# Patient Record
Sex: MALE | Race: OTHER | HISPANIC OR LATINO
[De-identification: names, ages, dates, MRNs, and addresses within clinical notes are randomized per-mention and may not be internally consistent; named-entity substitution may affect disease eponyms.]

---

## 2018-03-22 ENCOUNTER — APPOINTMENT (OUTPATIENT)
Dept: NEPHROLOGY | Facility: CLINIC | Age: 48
End: 2018-03-22

## 2020-12-02 ENCOUNTER — INPATIENT (INPATIENT)
Facility: HOSPITAL | Age: 50
LOS: 8 days | Discharge: HOME | End: 2020-12-11
Attending: INTERNAL MEDICINE | Admitting: INTERNAL MEDICINE
Payer: COMMERCIAL

## 2020-12-02 VITALS
TEMPERATURE: 98 F | OXYGEN SATURATION: 100 % | DIASTOLIC BLOOD PRESSURE: 90 MMHG | HEIGHT: 68 IN | HEART RATE: 80 BPM | SYSTOLIC BLOOD PRESSURE: 163 MMHG | RESPIRATION RATE: 18 BRPM

## 2020-12-02 DIAGNOSIS — N19 UNSPECIFIED KIDNEY FAILURE: ICD-10-CM

## 2020-12-02 DIAGNOSIS — E03.9 HYPOTHYROIDISM, UNSPECIFIED: ICD-10-CM

## 2020-12-02 DIAGNOSIS — N18.6 END STAGE RENAL DISEASE: ICD-10-CM

## 2020-12-02 DIAGNOSIS — I10 ESSENTIAL (PRIMARY) HYPERTENSION: ICD-10-CM

## 2020-12-02 LAB
ALBUMIN SERPL ELPH-MCNC: 4.3 G/DL — SIGNIFICANT CHANGE UP (ref 3.3–5)
ALP SERPL-CCNC: 62 U/L — SIGNIFICANT CHANGE UP (ref 40–120)
ALT FLD-CCNC: 15 U/L — SIGNIFICANT CHANGE UP (ref 4–41)
ANION GAP SERPL CALC-SCNC: 21 MMO/L — HIGH (ref 7–14)
APTT BLD: 30.2 SEC — SIGNIFICANT CHANGE UP (ref 27–36.3)
AST SERPL-CCNC: 12 U/L — SIGNIFICANT CHANGE UP (ref 4–40)
BASE EXCESS BLDV CALC-SCNC: -7 MMOL/L — SIGNIFICANT CHANGE UP
BASOPHILS # BLD AUTO: 0.05 K/UL — SIGNIFICANT CHANGE UP (ref 0–0.2)
BASOPHILS NFR BLD AUTO: 0.7 % — SIGNIFICANT CHANGE UP (ref 0–2)
BILIRUB SERPL-MCNC: 0.3 MG/DL — SIGNIFICANT CHANGE UP (ref 0.2–1.2)
BLOOD GAS VENOUS - CREATININE: SIGNIFICANT CHANGE UP MG/DL (ref 0.5–1.3)
BLOOD GAS VENOUS - FIO2: 21 — SIGNIFICANT CHANGE UP
BUN SERPL-MCNC: 102 MG/DL — HIGH (ref 7–23)
CALCIUM SERPL-MCNC: 6.5 MG/DL — CRITICAL LOW (ref 8.4–10.5)
CHLORIDE BLDV-SCNC: 106 MMOL/L — SIGNIFICANT CHANGE UP (ref 96–108)
CHLORIDE SERPL-SCNC: 101 MMOL/L — SIGNIFICANT CHANGE UP (ref 98–107)
CO2 SERPL-SCNC: 17 MMOL/L — LOW (ref 22–31)
CREAT SERPL-MCNC: 14.25 MG/DL — HIGH (ref 0.5–1.3)
EOSINOPHIL # BLD AUTO: 0.11 K/UL — SIGNIFICANT CHANGE UP (ref 0–0.5)
EOSINOPHIL NFR BLD AUTO: 1.6 % — SIGNIFICANT CHANGE UP (ref 0–6)
GAS PNL BLDV: 140 MMOL/L — SIGNIFICANT CHANGE UP (ref 136–146)
GLUCOSE BLDV-MCNC: 111 MG/DL — HIGH (ref 70–99)
GLUCOSE SERPL-MCNC: 111 MG/DL — HIGH (ref 70–99)
HCO3 BLDV-SCNC: 18 MMOL/L — LOW (ref 20–27)
HCT VFR BLD CALC: 26.5 % — LOW (ref 39–50)
HCT VFR BLDV CALC: 29.1 % — LOW (ref 39–51)
HGB BLD-MCNC: 8.8 G/DL — LOW (ref 13–17)
HGB BLDV-MCNC: 9.4 G/DL — LOW (ref 13–17)
IMM GRANULOCYTES NFR BLD AUTO: 0.3 % — SIGNIFICANT CHANGE UP (ref 0–1.5)
INR BLD: 1.07 — SIGNIFICANT CHANGE UP (ref 0.88–1.16)
LACTATE BLDV-MCNC: 0.9 MMOL/L — SIGNIFICANT CHANGE UP (ref 0.5–2)
LYMPHOCYTES # BLD AUTO: 1.1 K/UL — SIGNIFICANT CHANGE UP (ref 1–3.3)
LYMPHOCYTES # BLD AUTO: 16.1 % — SIGNIFICANT CHANGE UP (ref 13–44)
MCHC RBC-ENTMCNC: 28.9 PG — SIGNIFICANT CHANGE UP (ref 27–34)
MCHC RBC-ENTMCNC: 33.2 % — SIGNIFICANT CHANGE UP (ref 32–36)
MCV RBC AUTO: 86.9 FL — SIGNIFICANT CHANGE UP (ref 80–100)
MONOCYTES # BLD AUTO: 0.74 K/UL — SIGNIFICANT CHANGE UP (ref 0–0.9)
MONOCYTES NFR BLD AUTO: 10.8 % — SIGNIFICANT CHANGE UP (ref 2–14)
NEUTROPHILS # BLD AUTO: 4.81 K/UL — SIGNIFICANT CHANGE UP (ref 1.8–7.4)
NEUTROPHILS NFR BLD AUTO: 70.5 % — SIGNIFICANT CHANGE UP (ref 43–77)
NRBC # FLD: 0 K/UL — SIGNIFICANT CHANGE UP (ref 0–0)
PCO2 BLDV: 42 MMHG — SIGNIFICANT CHANGE UP (ref 41–51)
PH BLDV: 7.27 PH — LOW (ref 7.32–7.43)
PLATELET # BLD AUTO: 193 K/UL — SIGNIFICANT CHANGE UP (ref 150–400)
PMV BLD: 9 FL — SIGNIFICANT CHANGE UP (ref 7–13)
PO2 BLDV: 42 MMHG — HIGH (ref 35–40)
POTASSIUM BLDV-SCNC: 4 MMOL/L — SIGNIFICANT CHANGE UP (ref 3.4–4.5)
POTASSIUM SERPL-MCNC: 4.1 MMOL/L — SIGNIFICANT CHANGE UP (ref 3.5–5.3)
POTASSIUM SERPL-SCNC: 4.1 MMOL/L — SIGNIFICANT CHANGE UP (ref 3.5–5.3)
PROT SERPL-MCNC: 8.2 G/DL — SIGNIFICANT CHANGE UP (ref 6–8.3)
PROTHROM AB SERPL-ACNC: 12.3 SEC — SIGNIFICANT CHANGE UP (ref 10.6–13.6)
RBC # BLD: 3.05 M/UL — LOW (ref 4.2–5.8)
RBC # FLD: 13.2 % — SIGNIFICANT CHANGE UP (ref 10.3–14.5)
SAO2 % BLDV: 74.7 % — SIGNIFICANT CHANGE UP (ref 60–85)
SARS-COV-2 RNA SPEC QL NAA+PROBE: SIGNIFICANT CHANGE UP
SODIUM SERPL-SCNC: 139 MMOL/L — SIGNIFICANT CHANGE UP (ref 135–145)
WBC # BLD: 6.83 K/UL — SIGNIFICANT CHANGE UP (ref 3.8–10.5)
WBC # FLD AUTO: 6.83 K/UL — SIGNIFICANT CHANGE UP (ref 3.8–10.5)

## 2020-12-02 PROCEDURE — 99284 EMERGENCY DEPT VISIT MOD MDM: CPT

## 2020-12-02 PROCEDURE — 99223 1ST HOSP IP/OBS HIGH 75: CPT

## 2020-12-02 RX ORDER — CALCIUM ACETATE 667 MG
2001 TABLET ORAL
Refills: 0 | Status: DISCONTINUED | OUTPATIENT
Start: 2020-12-02 | End: 2020-12-11

## 2020-12-02 RX ORDER — CALCIUM GLUCONATE 100 MG/ML
2 VIAL (ML) INTRAVENOUS ONCE
Refills: 0 | Status: DISCONTINUED | OUTPATIENT
Start: 2020-12-02 | End: 2020-12-02

## 2020-12-02 RX ORDER — LEVOTHYROXINE SODIUM 125 MCG
112 TABLET ORAL DAILY
Refills: 0 | Status: DISCONTINUED | OUTPATIENT
Start: 2020-12-02 | End: 2020-12-11

## 2020-12-02 RX ORDER — METOPROLOL TARTRATE 50 MG
50 TABLET ORAL DAILY
Refills: 0 | Status: DISCONTINUED | OUTPATIENT
Start: 2020-12-02 | End: 2020-12-11

## 2020-12-02 RX ORDER — ERYTHROPOIETIN 10000 [IU]/ML
10000 INJECTION, SOLUTION INTRAVENOUS; SUBCUTANEOUS
Refills: 0 | Status: DISCONTINUED | OUTPATIENT
Start: 2020-12-02 | End: 2020-12-09

## 2020-12-02 RX ORDER — LEVOTHYROXINE SODIUM 125 MCG
112 TABLET ORAL DAILY
Refills: 0 | Status: DISCONTINUED | OUTPATIENT
Start: 2020-12-02 | End: 2020-12-02

## 2020-12-02 NOTE — ED ADULT TRIAGE NOTE - CHIEF COMPLAINT QUOTE
Pt sent to ED by nephrologist Dr. Jonh Mccarthy to start dialysis. Pt reports he is in kidney failure. Denies chest pain, shortness of breath, headaches, palpitations.

## 2020-12-02 NOTE — H&P ADULT - NSHPPHYSICALEXAM_GEN_ALL_CORE
T(C): 36.6 (12-02-20 @ 15:27), Max: 36.7 (12-02-20 @ 14:48)  HR: 72 (12-02-20 @ 15:27) (72 - 80)  BP: 129/97 (12-02-20 @ 15:27) (129/97 - 187/100)  RR: 14 (12-02-20 @ 15:27) (14 - 18)  SpO2: 100% (12-02-20 @ 15:27) (100% - 100%)  Wt(kg): --  GENERAL: NAD, well-developed  HEAD:  Atraumatic, Normocephalic  EYES: EOMI, PERRLA, conjunctiva and sclera clear  NECK: Supple, No JVD  CHEST/LUNG: Clear to auscultation bilaterally; No wheeze  HEART: Regular rate and rhythm; No murmurs, rubs, or gallops  ABDOMEN: Soft, Nontender, Nondistended; Bowel sounds present  EXTREMITIES:  2+ Peripheral Pulses, No clubbing, cyanosis, or edema  PSYCH: AAOx3  NEUROLOGY: non-focal  SKIN: No rashes or lesions

## 2020-12-02 NOTE — ED ADULT TRIAGE NOTE - HEART RATE (BEATS/MIN)
Discharge Planning Assessment  TITUS Welch     Patient Name: Charlie Solano  MRN: [id number]  Today's Date: 4/9/2020    Admit Date: 4/8/2020    Discharge Needs Assessment       Row Name 04/09/20 1459       Living Environment    Lives With  child(mirna), adult;spouse    Name(s) of Who Lives With Patient  Savanah Solano, wife and 22 year old son    Current Living Arrangements  home/apartment/condo single story house with three streets to gain entry    Duration at Residence  8 1/2 years    Potentially Unsafe Housing Conditions  -- none    Primary Care Provided by  self    Provides Primary Care For  no one    Caregiving Concerns  none voiced    Family Caregiver if Needed  spouse;child(mirna), adult    Family Caregiver Names  Savanah and 22 year old son    Quality of Family Relationships  unable to assess    Able to Return to Prior Arrangements  yes    Living Arrangement Comments  pt states he lives with his wife and 22 year old son in a single story home with three steps to gain entry       Resource/Environmental Concerns    Resource/Environmental Concerns  none    Transportation Concerns  -- none       Transition Planning    Patient/Family Anticipates Transition to  home with family    Patient/Family Anticipated Services at Transition  ;home health care    Transportation Anticipated  car, drives self;family or friend will provide pt states he still drives but his wife will be able to provide ride at discharge       Discharge Needs Assessment    Readmission Within the Last 30 Days  no previous admission in last 30 days    Concerns to be Addressed  discharge planning    Concerns Comments  none voiced    Equipment Currently Used at Home  bath bench;crutches;prosthesis;grab bar;bipap/cpap CPAP through [employer]    Anticipated Changes Related to Illness  none    Equipment Needed After Discharge  -- undecided at this time    Outpatient/Agency/Support Group Needs  home care agency    Discharge Facility/Level of Care Needs   home with home health    Provided Post Acute Provider List?  Yes    Post Acute Provider List  Home Health    Delivered To  Patient    Method of Delivery  In person    Patient's Choice of Community Agency(s)  None    Current Discharge Risk  -- none    Discharge Coordination/Progress  pt states he plans on returning home at discharge with his wife and son to help as needed and followed by home health, pt is undecided if he will need any other equipment            Discharge Plan       Row Name 04/09/20 1507       Plan    Plan  Home with home health    Patient/Family in Agreement with Plan  yes    Plan Comments  Into room and introduced self and role of CM. Discussed discharge disposition with patient at bedside. Patient confirms that the info on her face sheet and see's Kvng Hernandez as PCP. He states that he uses [facility] in [place] and has no problem picking up his medications or paying for them. He states that he has a health care POA but it is not on file here, CM encouraged him to bring it in so it can be scanned into his records and he verbalized understanding. Patient states he lives with his wife Savanah and 22 year old son in a single story house with three steps to gain entry and has no problem maneuvering the steps or within his home. He states he is independent with his ADL's still works and drives but his wife will be able to provide ride home at discharge. He states he currently has a prosthetic leg that he uses, grab bars, crutches, bath bench and a CPAP through [facility] and is uncertain if he will need anything else at discharge. Patient states his wife has used home health before but he is unsure of who it was. CM provided patient with a list of home health agencies for his preference. Offered community resources but patient declines the need for them at this time. Patient states he plans on returning home with his wife and son to help and home health for PT/OT. Patient had no other questions  or concerns regarding discharge plans at this time. Name and number placed on white board in room. CM will continue to follow for needs.            Destination        Coordination has not been started for this encounter.          Durable Medical Equipment        Coordination has not been started for this encounter.          Dialysis/Infusion        Coordination has not been started for this encounter.          Home Medical Care        Coordination has not been started for this encounter.          Therapy        Coordination has not been started for this encounter.          Community Resources        Coordination has not been started for this encounter.              Demographic Summary       Row Name 04/09/20 7813       General Information    Admission Type  inpatient    Arrived From  home    Referral Source  admission list    Reason for Consult  discharge planning    Preferred Language  English     Used During This Interaction  no       Contact Information    Permission Granted to Share Info With              Functional Status    No documentation.         Psychosocial    No documentation.         Abuse/Neglect    No documentation.         Legal    No documentation.         Substance Abuse    No documentation.         Patient Forms    No documentation.             Elaina Busby RN     80

## 2020-12-02 NOTE — H&P ADULT - NSHPLABSRESULTS_GEN_ALL_CORE
(12-02 @ 14:45)                      8.8  6.83 )-----------( 193                 26.5    Neutrophils = 4.81 (70.5%)  Lymphocytes = 1.10 (16.1%)  Eosinophils = 0.11 (1.6%)  Basophils = 0.05 (0.7%)  Monocytes = 0.74 (10.8%)  Bands = --%    12-02    139  |  101  |  102<H>  ----------------------------<  111<H>  4.1   |  17<L>  |  14.25<H>    Ca    6.5<LL>      02 Dec 2020 14:45    TPro  8.2  /  Alb  4.3  /  TBili  0.3  /  DBili  x   /  AST  12  /  ALT  15  /  AlkPhos  62  12-02    ( 02 Dec 2020 14:45 )   PT: 12.3 SEC;   INR: 1.07 ;     PTT:30.2 SEC    Venous Blood Gas:  12-02 @ 14:45  7.27/42/42/18/74.7  VBG Lactate: 0.9

## 2020-12-02 NOTE — ED PROVIDER NOTE - CLINICAL SUMMARY MEDICAL DECISION MAKING FREE TEXT BOX
Liliana: ESRD (not yet on dialysis), p/w 1 month nausea, sent by Renal for dialysis (likely 2/2 uremia). Appears euvolemic. Check labs, EKG. Admit to start dialysis.

## 2020-12-02 NOTE — ED PROVIDER NOTE - ATTENDING CONTRIBUTION TO CARE
I performed a face-to-face evaluation of the patient and performed a history and physical examination. I agree with the history and physical examination.    ESRD (not yet on dialysis), p/w 1 month nausea, sent by Renal for dialysis (likely 2/2 uremia). Appears euvolemic. Check labs, EKG. Admit to start dialysis.

## 2020-12-02 NOTE — CONSULT NOTE ADULT - SUBJECTIVE AND OBJECTIVE BOX
Vascular & Interventional Radiology Brief Consult Note    Evaluate for Procedure: Nontunneled catheter placement    HPI: 49y Male with history of hypertension, hypothyroidism, and chronic glomerulonephritis. Over the past few weeks, he has developed worsening nausea and vomiting. His appetite is substantially down from baseline, and he has lost a fair amount of weight. He has also been more fatigued than usual of late, and has had to nap during the day as a result. On bloodwork from last week, his creatinine was over 13mg/dL. He does not presently have any form of access for HD or PD. For the past several months, he has been in the process of being evaluated for renal transplant through [facility]; he has not yet finished the workup, and as a result he is not yet on the transplant list    Allergies:   Medications (Abx/Cardiac/Anticoagulation/Blood Products)      Data:  172.7  T(C): 36.6  HR: 72  BP: 129/97  RR: 14  SpO2: 100%    -WBC 6.83 / HgB 8.8 / Hct 26.5 / Plt 193  -Na 139 / Cl 101 /  / Glucose 111  -K 4.1 / CO2 17 / Cr 14.25  -ALT 15 / Alk Phos 62 / T.Bili 0.3  -INR 1.07 / PTT 30.2      Assessment/Plan:   -49y Male with CKD5 currently being evaluated for renal transplant. Needs HD and does not currently have access. IR consulted for nontunneled catheter placement.    Plan  -Place IR procedure order with Dr. Marlon Serrano for 12/3  -NPO at midnight  -Hold DVT PPx in AM  -BMP and CBC in AM  -Page [phone] for any additional questions.

## 2020-12-02 NOTE — ED ADULT NURSE NOTE - OBJECTIVE STATEMENT
49 year old male A&Ox4, ambulatory with PHX: HTN, hypothyroid, AFIB not on AC sent in by PCP for declining renal function and eval for dialysis. PT states last week was experiencing weakness fatigue, diarrhea, nausea saw PCP yesterday had labs showing worsening renal function and hyperkalemia. PT currently denies any pain or medical complaints. MD evaluated, VS as noted. 20 G IV placed to L arm, labs sent. Comfort measures provided, call bell within reach.

## 2020-12-02 NOTE — ED ADULT NURSE REASSESSMENT NOTE - NS ED NURSE REASSESS COMMENT FT1
Pt awake, alert and oriented x 4 denies chest pain or SOB, denies headache, dizziness or blurry vision.  VSS.   IV site unremarkable.   Ambulating to bathroom to void without difficulty.

## 2020-12-02 NOTE — CONSULT NOTE ADULT - SUBJECTIVE AND OBJECTIVE BOX
HPI: Mr. Chan is a 49 year-old man with history of multiple medical issues including hypertension, hypothyroidism, and chronic glomerulonephritis; he is well-known to me. Over the past few weeks, he has developed worsening nausea and vomiting. His appetite is substantially down from baseline, and he has lost a fair amount of weight. He has also been more fatigued than usual of late, and has had to nap during the day as a result. On bloodwork from last week, his creatinine was over 13mg/dL. In light of his severely elevated creatinine and his associated symptoms, I advised him to present to the ER for dialysis initiation.  Mr. Chan is opting for eventual conversion to peritoneal dialysis. He does not presently have any form of access for HD or PD. For the past several months, he has been in the process of being evaluated for renal transplant through [facility]; he has not yet finished the workup, and as a result he is not yet on the transplant list.  PAST MEDICAL & SURGICAL HISTORY: Hypothyroid HTN (hypertension) CKD5 - glomerulonephritis NOS   Allergies No Known Allergies  SOCIAL HISTORY: Denies ETOh,Smoking,   FAMILY HISTORY: No pertinent family history in first degree relatives  REVIEW OF SYSTEMS: CONSTITUTIONAL: (+)fatigue/(+)hypersomnia; (+)weakness, (+)loss of appetite, (+)weight loss; no fever/chills EYES/ENT: No visual changes;  No vertigo or throat pain  NECK: No pain or stiffness RESPIRATORY: No cough, wheezing, hemoptysis; No shortness of breath CARDIOVASCULAR: No chest pain or palpitations GASTROINTESTINAL: (+)nausea, (+)vomiting GENITOURINARY: No dysuria, frequency or hematuria NEUROLOGICAL: No numbness or weakness SKIN: No itching, burning, rashes, or lesions  All other review of systems is negative unless indicated above.  VITAL: T(C): , Max: 36.7 (12-02-20 @ 14:48) T(F): , Max: 98.1 (12-02-20 @ 14:48) HR: 72 (12-02-20 @ 15:27) BP: 129/97 (12-02-20 @ 15:27) RR: 14 (12-02-20 @ 15:27) SpO2: 100% (12-02-20 @ 15:27)  PHYSICAL EXAM: Constitutional: NAD, Alert HEENT: NCAT, MMM Neck: Supple, No JVD Respiratory: CTA-b/l Cardiovascular: RRR s1s2, no m/r/g Gastrointestinal: BS+, soft, NT/ND Extremities: No peripheral edema b/l Neurological: no focal deficits; strength grossly intact Back: no CVAT b/l Skin: No rashes, no nevi  LABS:                      8.8   6.83  )-----------( 193      ( 02 Dec 2020 14:45 )            26.5   Na(139)/K(4.1)/Cl(101)/HCO3(17)/BUN(102)/Cr(14.25)Glu(111)/Ca(6.5)/Mg(--)/PO4(--)    12-02 @ 14:45   IMAGING: pending from ER  ASSESSMENT: (1)Renal - CKD5; uremic. 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 This is a chronic phenomenon for him. Whereas we can place him on Phoslo with meals, I would not give IV calcium unless (a)total Ca <6, (b)symptoms associated with hypocalcemia, or (c)PO4 is <6 Administration of IV calcium in the setting of hyperphosphatemia could induce metastatic calcification  (3)Metabolic acidosis - renally mediated - will improve with HD. Would not give IV NaHCO3 here, as it would place him at increased risk of complications (seizures, arrhythmias, etc) from hypocalcemia  (4)Anemia - renally mediated   RECOMMEND: (1)Admit to [facility] hospitalist (patient of Dr. Katherin Triplett)  (2)HBSAg/HBSAb/HBCAb/HCVAb STAT  (3)Shiley placement by [facility] scruggs for tomorrow a.m.  (4)HD x 3 consecutive days, once shiley in place (eventual conversion to tunneled catheter during admission) Will plan for high-calcium bath  (5)Phoslo 2001mg po tid with meals; no IV calcium unless meets criteria above for it  (6)Retacrit 10K SQ TIW with HD  (7)Surgery evaluation Dr. Lam Kelly for PD catheter placement  (8)Will look to set up for outside HD/PD   Thank you for involving [facility] in this patient's care.  With warm regards,  Kong Mccarthy MD  [facility] Office: [phone] Cell: [phone]

## 2020-12-02 NOTE — H&P ADULT - HISTORY OF PRESENT ILLNESS
50 yo M hx hypertension, hypothyroidism, and chronic glomerulonephritis and CKD/ESRD sent in from nephrology office for HD. Over the past few weeks, patient has developed worsening nausea and vomiting. His appetite is substantially down from baseline, and he has lost a fair amount of weight. He has also been more fatigued than usual of late, and has had to nap during the day as a result. Per patient's nephrologist' s documentation, patient's creatinine was over 13mg/dL. In light of his severely elevated creatinine and his associated symptoms, patient was advised to present to the ER for dialysis initiation.

## 2020-12-02 NOTE — H&P ADULT - PROBLEM SELECTOR PLAN 1
- Monitor electrolytes and renal functions.  - IR consulted for chavo placement. HD plan per nephrology recs.  - Phos binder TID.  - Renal diet.  - Hold chemo dvt ppx until patient receives chavo placement.  - Consult surgery Dr. Lam Kelly for PD catheter placement in the am.

## 2020-12-02 NOTE — H&P ADULT - NSHPREVIEWOFSYSTEMS_GEN_ALL_CORE
CONSTITUTIONAL: fatigue  EYES/ENT: No visual changes;  No vertigo or throat pain   NECK: No pain or stiffness  RESPIRATORY: No cough, wheezing, hemoptysis; No shortness of breath  CARDIOVASCULAR: No chest pain or palpitations  GASTROINTESTINAL: n/v, no abd pain  GENITOURINARY: No dysuria, frequency or hematuria  NEUROLOGICAL: No numbness or weakness  SKIN: No itching, burning, rashes, or lesions   PSYCH: No Depression, no anxiety  All other review of systems is negative unless indicated above.

## 2020-12-02 NOTE — ED PROVIDER NOTE - OBJECTIVE STATEMENT
Pt is a 50 y/o M PMHx HTN, hypothyroidism, CKD p/w nausea x 3 weeks.  Pt states he developed gradual onset intermittent nausea with associated intermittent nonbloody nonbilious vomiting.  Pt also notes associated fatigue.  He states he had blood work performed with nephrologist last week.  He states he was then told to go to [facility] ED to possibly start dialysis.  Pt otherwise denies any fevers, chills, chest pain, SOB, palpitations, decreased urination, abdominal pain, lightheadedness.

## 2020-12-02 NOTE — H&P ADULT - ASSESSMENT
50 yo M hx hypertension, hypothyroidism, and chronic glomerulonephritis and CKD/ESRD sent in from nephrology office for HD.

## 2020-12-02 NOTE — CONSULT NOTE ADULT - SUBJECTIVE AND OBJECTIVE BOX
CC: 49y old Male admitted with a chief complaint of Need for HD,    HPI:  48 yo M hx hypertension, hypothyroidism, and chronic glomerulonephritis and CKD/ESRD sent in from nephrology office for HD. Over the past few weeks, patient has developed worsening nausea and vomiting. His appetite is substantially down from baseline, and he has lost a fair amount of weight. He has also been more fatigued than usual of late, and has had to nap during the day as a result. Per patient's nephrologist' s documentation, patient's creatinine was over 13mg/dL. In light of his severely elevated creatinine and his associated symptoms, patient was advised to present to the ER for dialysis initiation.    Surgery consulted for placement of peritoneal dialysis catheter for transition eventually from HD to PD. Patient denies hx of previous abdominal procedures.     PMHx: Hypothyroid    HTN (hypertension)      PSHx: No significant past surgical history      Medications (inpatient): calcium acetate 2001 milliGRAM(s) Oral three times a day with meals  epoetin demetrio-epbx (RETACRIT) Injectable 64847 Unit(s) IV Push <User Schedule>  levothyroxine 112 MICROGram(s) Oral daily  metoprolol succinate ER 50 milliGRAM(s) Oral daily    Medications (PRN):  Allergies: No Known Allergies  (Intolerances: )  Social Hx:   Family Hx: No pertinent family history in first degree relatives        Physical Exam  T(C): 36.6  HR: 72 (72 - 80)  BP: 129/97 (129/97 - 187/100)  RR: 14 (14 - 18)  SpO2: 100% (100% - 100%)  Tmax: T(C): , Max: 36.7 (12-02-20 @ 14:48)    General: well developed, well nourished, NAD  Neuro: alert and oriented, no focal deficits, moves all extremities spontaneously  HEENT: NCAT, EOMI, anicteric, mucosa moist  Respiratory: airway patent, respirations unlabored  CVS: regular rate and rhythm  Abdomen: soft, nontender, nondistended  Extremities: no edema, sensation and movement grossly intact  Skin: warm, dry, appropriate color    Labs:                        8.8    6.83  )-----------( 193      ( 02 Dec 2020 14:45 )             26.5     PT/INR - ( 02 Dec 2020 14:45 )   PT: 12.3 SEC;   INR: 1.07          PTT - ( 02 Dec 2020 14:45 )  PTT:30.2 SEC  12-02    139  |  101  |  102<H>  ----------------------------<  111<H>  4.1   |  17<L>  |  14.25<H>    Ca    6.5<LL>      02 Dec 2020 14:45    TPro  8.2  /  Alb  4.3  /  TBili  0.3  /  DBili  x   /  AST  12  /  ALT  15  /  AlkPhos  62  12-02

## 2020-12-02 NOTE — ED ADULT NURSE REASSESSMENT NOTE - NS ED NURSE REASSESS COMMENT FT1
Report given to [facility] 1 RN - pt awake and alert and oriented x 4 denies chest pain or SOB.   Ambulatory, tolerated PO.  IV site unremarkable.

## 2020-12-03 LAB
ALBUMIN SERPL ELPH-MCNC: 4.1 G/DL — SIGNIFICANT CHANGE UP (ref 3.3–5)
ALP SERPL-CCNC: 62 U/L — SIGNIFICANT CHANGE UP (ref 40–120)
ALT FLD-CCNC: 13 U/L — SIGNIFICANT CHANGE UP (ref 4–41)
ANION GAP SERPL CALC-SCNC: 22 MMO/L — HIGH (ref 7–14)
AST SERPL-CCNC: 11 U/L — SIGNIFICANT CHANGE UP (ref 4–40)
BASE EXCESS BLDV CALC-SCNC: -8.2 MMOL/L — SIGNIFICANT CHANGE UP
BASOPHILS # BLD AUTO: 0.03 K/UL — SIGNIFICANT CHANGE UP (ref 0–0.2)
BASOPHILS NFR BLD AUTO: 0.5 % — SIGNIFICANT CHANGE UP (ref 0–2)
BILIRUB SERPL-MCNC: 0.3 MG/DL — SIGNIFICANT CHANGE UP (ref 0.2–1.2)
BLOOD GAS VENOUS - CREATININE: 15.3 MG/DL — HIGH (ref 0.5–1.3)
BLOOD GAS VENOUS - FIO2: 21 — SIGNIFICANT CHANGE UP
BUN SERPL-MCNC: 103 MG/DL — HIGH (ref 7–23)
CALCIUM SERPL-MCNC: 6.4 MG/DL — CRITICAL LOW (ref 8.4–10.5)
CHLORIDE BLDV-SCNC: 107 MMOL/L — SIGNIFICANT CHANGE UP (ref 96–108)
CHLORIDE SERPL-SCNC: 102 MMOL/L — SIGNIFICANT CHANGE UP (ref 98–107)
CO2 SERPL-SCNC: 15 MMOL/L — LOW (ref 22–31)
CREAT SERPL-MCNC: 13.65 MG/DL — HIGH (ref 0.5–1.3)
EOSINOPHIL # BLD AUTO: 0.11 K/UL — SIGNIFICANT CHANGE UP (ref 0–0.5)
EOSINOPHIL NFR BLD AUTO: 1.8 % — SIGNIFICANT CHANGE UP (ref 0–6)
GAS PNL BLDV: 142 MMOL/L — SIGNIFICANT CHANGE UP (ref 136–146)
GLUCOSE BLDV-MCNC: 103 MG/DL — HIGH (ref 70–99)
GLUCOSE SERPL-MCNC: 107 MG/DL — HIGH (ref 70–99)
HBV SURFACE AG SER-ACNC: NEGATIVE — SIGNIFICANT CHANGE UP
HCO3 BLDV-SCNC: 17 MMOL/L — LOW (ref 20–27)
HCT VFR BLD CALC: 25.3 % — LOW (ref 39–50)
HCT VFR BLDV CALC: 23 % — LOW (ref 39–51)
HGB BLD-MCNC: 8.6 G/DL — LOW (ref 13–17)
HGB BLDV-MCNC: 7.4 G/DL — LOW (ref 13–17)
IMM GRANULOCYTES NFR BLD AUTO: 0.5 % — SIGNIFICANT CHANGE UP (ref 0–1.5)
LACTATE BLDV-MCNC: 0.7 MMOL/L — SIGNIFICANT CHANGE UP (ref 0.5–2)
LYMPHOCYTES # BLD AUTO: 1.04 K/UL — SIGNIFICANT CHANGE UP (ref 1–3.3)
LYMPHOCYTES # BLD AUTO: 17.3 % — SIGNIFICANT CHANGE UP (ref 13–44)
MAGNESIUM SERPL-MCNC: 1.7 MG/DL — SIGNIFICANT CHANGE UP (ref 1.6–2.6)
MCHC RBC-ENTMCNC: 28.8 PG — SIGNIFICANT CHANGE UP (ref 27–34)
MCHC RBC-ENTMCNC: 34 % — SIGNIFICANT CHANGE UP (ref 32–36)
MCV RBC AUTO: 84.6 FL — SIGNIFICANT CHANGE UP (ref 80–100)
MONOCYTES # BLD AUTO: 0.5 K/UL — SIGNIFICANT CHANGE UP (ref 0–0.9)
MONOCYTES NFR BLD AUTO: 8.3 % — SIGNIFICANT CHANGE UP (ref 2–14)
NEUTROPHILS # BLD AUTO: 4.29 K/UL — SIGNIFICANT CHANGE UP (ref 1.8–7.4)
NEUTROPHILS NFR BLD AUTO: 71.6 % — SIGNIFICANT CHANGE UP (ref 43–77)
NRBC # FLD: 0 K/UL — SIGNIFICANT CHANGE UP (ref 0–0)
PCO2 BLDV: 38 MMHG — LOW (ref 41–51)
PH BLDV: 7.28 PH — LOW (ref 7.32–7.43)
PHOSPHATE SERPL-MCNC: 7 MG/DL — HIGH (ref 2.5–4.5)
PLATELET # BLD AUTO: 166 K/UL — SIGNIFICANT CHANGE UP (ref 150–400)
PMV BLD: 8.6 FL — SIGNIFICANT CHANGE UP (ref 7–13)
PO2 BLDV: 38 MMHG — SIGNIFICANT CHANGE UP (ref 35–40)
POTASSIUM BLDV-SCNC: 3.8 MMOL/L — SIGNIFICANT CHANGE UP (ref 3.4–4.5)
POTASSIUM SERPL-MCNC: 3.8 MMOL/L — SIGNIFICANT CHANGE UP (ref 3.5–5.3)
POTASSIUM SERPL-SCNC: 3.8 MMOL/L — SIGNIFICANT CHANGE UP (ref 3.5–5.3)
PROT SERPL-MCNC: 7.6 G/DL — SIGNIFICANT CHANGE UP (ref 6–8.3)
RBC # BLD: 2.99 M/UL — LOW (ref 4.2–5.8)
RBC # FLD: 13.2 % — SIGNIFICANT CHANGE UP (ref 10.3–14.5)
SAO2 % BLDV: 71 % — SIGNIFICANT CHANGE UP (ref 60–85)
SODIUM SERPL-SCNC: 139 MMOL/L — SIGNIFICANT CHANGE UP (ref 135–145)
WBC # BLD: 6 K/UL — SIGNIFICANT CHANGE UP (ref 3.8–10.5)
WBC # FLD AUTO: 6 K/UL — SIGNIFICANT CHANGE UP (ref 3.8–10.5)

## 2020-12-03 PROCEDURE — 77001 FLUOROGUIDE FOR VEIN DEVICE: CPT | Mod: 26,GC

## 2020-12-03 PROCEDURE — 76937 US GUIDE VASCULAR ACCESS: CPT | Mod: 26

## 2020-12-03 PROCEDURE — 36556 INSERT NON-TUNNEL CV CATH: CPT

## 2020-12-03 RX ORDER — HEPARIN SODIUM 5000 [USP'U]/ML
5000 INJECTION INTRAVENOUS; SUBCUTANEOUS EVERY 8 HOURS
Refills: 0 | Status: DISCONTINUED | OUTPATIENT
Start: 2020-12-03 | End: 2020-12-11

## 2020-12-03 RX ORDER — ONDANSETRON 8 MG/1
4 TABLET, FILM COATED ORAL ONCE
Refills: 0 | Status: COMPLETED | OUTPATIENT
Start: 2020-12-03 | End: 2020-12-03

## 2020-12-03 RX ORDER — CHLORHEXIDINE GLUCONATE 213 G/1000ML
1 SOLUTION TOPICAL
Refills: 0 | Status: DISCONTINUED | OUTPATIENT
Start: 2020-12-03 | End: 2020-12-11

## 2020-12-03 RX ORDER — SODIUM CHLORIDE 9 MG/ML
10 INJECTION INTRAMUSCULAR; INTRAVENOUS; SUBCUTANEOUS
Refills: 0 | Status: DISCONTINUED | OUTPATIENT
Start: 2020-12-03 | End: 2020-12-11

## 2020-12-03 RX ADMIN — HEPARIN SODIUM 5000 UNIT(S): 5000 INJECTION INTRAVENOUS; SUBCUTANEOUS at 22:26

## 2020-12-03 RX ADMIN — Medication 50 MILLIGRAM(S): at 05:04

## 2020-12-03 RX ADMIN — Medication 2001 MILLIGRAM(S): at 12:36

## 2020-12-03 RX ADMIN — ERYTHROPOIETIN 10000 UNIT(S): 10000 INJECTION, SOLUTION INTRAVENOUS; SUBCUTANEOUS at 17:39

## 2020-12-03 NOTE — CHART NOTE - NSCHARTNOTEFT_GEN_A_CORE
PRE-INTERVENTIONAL RADIOLOGY PROCEDURE NOTE    Patient Age: 49  Patient Gender: M    Procedure (including site / side if known): Non tunneled cath    Diagnosis / Indication: Hemodialysis access    Interventional Radiology Attending Physician: Dr. Marlon Serrano    Ordering Attending Physician: Dr Jimenez    Pertinent medical history: ESRD, HTN, hypothyroidism     Pertinent labs:                       8.8    6.83  )-----------( 193      ( 02 Dec 2020 14:45 )             26.5   12-02    139  |  101  |  102<H>  ----------------------------<  111<H>  4.1   |  17<L>  |  14.25<H>    Ca    6.5<LL>      02 Dec 2020 14:45    TPro  8.2  /  Alb  4.3  /  TBili  0.3  /  DBili  x   /  AST  12  /  ALT  15  /  AlkPhos  62  12-02  PT/INR - ( 02 Dec 2020 14:45 )   PT: 12.3 SEC;   INR: 1.07          PTT - ( 02 Dec 2020 14:45 )  PTT:30.2 SEC    Patient and Family aware? Yes           Contact #: ____[id number]_________________

## 2020-12-03 NOTE — PROGRESS NOTE ADULT - SUBJECTIVE AND OBJECTIVE BOX
Mr. Chan is comfortable in bed  He denies nausea, vomiting, fever or chills  IR placed a temporary dialysis catheter    ICU Vital Signs Last 24 Hrs  T(C): 37.2 (03 Dec 2020 16:55), Max: 37.2 (03 Dec 2020 16:55)  T(F): 99 (03 Dec 2020 16:55), Max: 99 (03 Dec 2020 16:55)  HR: 77 (03 Dec 2020 16:55) (70 - 87)  BP: 147/88 (03 Dec 2020 16:55) (127/89 - 147/88)  BP(mean): --  ABP: --  ABP(mean): --  RR: 17 (03 Dec 2020 16:55) (17 - 18)  SpO2: 98% (03 Dec 2020 14:21) (98% - 98%)      On exam: he is awake, alert  Breathing comfortably  Abd is soft, not tender and not distended  No surgical scars                            8.6    6.00  )-----------( 166      ( 03 Dec 2020 05:23 )             25.3       12-03    139  |  102  |  103<H>  ----------------------------<  107<H>  3.8   |  15<L>  |  13.65<H>

## 2020-12-03 NOTE — PROGRESS NOTE ADULT - SUBJECTIVE AND OBJECTIVE BOX
Overnight events noted   VITAL: T(C): , Max: 37.1 (12-03-20 @ 05:02) T(F): , Max: 98.7 (12-03-20 @ 05:02) HR: 72 (12-03-20 @ 09:02) BP: 132/87 (12-03-20 @ 09:02) RR: 17 (12-03-20 @ 09:02) SpO2: 98% (12-03-20 @ 09:02)   PHYSICAL EXAM: Constitutional: NAD, Alert HEENT: NCAT, MMM Neck: Supple, No JVD Respiratory: CTA-b/l Cardiovascular: RRR s1s2, no m/r/g Gastrointestinal: BS+, soft, NT/ND Extremities: No peripheral edema b/l Neurological: no focal deficits; strength grossly intact Back: no CVAT b/l Skin: No rashes, no nevi  LABS:                      8.6   6.00  )-----------( 166      ( 03 Dec 2020 05:23 )            25.3   Na(139)/K(3.8)/Cl(102)/HCO3(15)/BUN(103)/Cr(13.65)Glu(107)/Ca(6.4)/Mg(1.7)/PO4(7.0)    12-03 @ 05:23 Na(139)/K(4.1)/Cl(101)/HCO3(17)/BUN(102)/Cr(14.25)Glu(111)/Ca(6.5)/Mg(--)/PO4(--)    12-02 @ 14:45   IMPRESSION: 49M w/ HTN, hypothyroidism, and CKD5, 12/2/20 a/w uremia  (1)Renal - CKD5; uremic. Needs renal replacement therapy initiation. Set for shiley placement today with IR, and for 1st HD afterwards. Planning for eventual conversion to peritoneal dialysis. I spoke with the team at [facility]; we are now planning for PD training in [place] in early 1/2021. In the interim between discharge and PD training, he can receive outpatient dialysis TIW in [place].   (2)Hypercalcemia - largely due to hyperphosphatemia (associated Ca-PO4 complexing); likely also a component from hypovitaminosis D. This is a chronic phenomenon for him. Safer to treat this with dialysis (with a high calcium bath) than with IV calcium; risk for metastatic calcification given his hyperphosphatemia.  (3)Metabolic acidosis - renally mediated - will improve with HD. Would not give IV NaHCO3 here, as it would place him at increased risk of complications from hypocalcemia  (4)Anemia - renally mediated   RECOMMEND: (1)Shiley placement as planned with IR; eventual conversion to tunneled cath with IR, later during the admissino (2)HD x each of the next 3 days (3)Phoslo as ordered/renal diet, once allowed to take PO (4)Retacrit with HD, as ordered (5)PD catheter placement early next week with Surgery Dr. Lam Kelly (6)F/U with SW regarding setup of outside dialysis at [place]   Kong Mccarthy MD [facility] Office: [phone] Cell: [phone]       Seen on HD - well-tolerating   VITAL: T(C): , Max: 37.1 (12-03-20 @ 05:02) T(F): , Max: 98.7 (12-03-20 @ 05:02) HR: 72 (12-03-20 @ 09:02) BP: 132/87 (12-03-20 @ 09:02) RR: 17 (12-03-20 @ 09:02) SpO2: 98% (12-03-20 @ 09:02)   PHYSICAL EXAM: Constitutional: NAD, Alert HEENT: NCAT, MMM Neck: Supple, No JVD Respiratory: CTA-b/l Cardiovascular: RRR s1s2, no m/r/g Gastrointestinal: BS+, soft, NT/ND Extremities: No peripheral edema b/l Neurological: no focal deficits; strength grossly intact Back: no CVAT b/l Skin: No rashes, no nevi Access: MARIO zee - accessed  LABS:                      8.6   6.00  )-----------( 166      ( 03 Dec 2020 05:23 )            25.3   Na(139)/K(3.8)/Cl(102)/HCO3(15)/BUN(103)/Cr(13.65)Glu(107)/Ca(6.4)/Mg(1.7)/PO4(7.0)    12-03 @ 05:23 Na(139)/K(4.1)/Cl(101)/HCO3(17)/BUN(102)/Cr(14.25)Glu(111)/Ca(6.5)/Mg(--)/PO4(--)    12-02 @ 14:45   IMPRESSION: 49M w/ HTN, hypothyroidism, and CKD5, 12/2/20 a/w uremia  (1)Renal - CKD5; uremic. S/p shiley placement today with IR, and now undergoing 1st HD. Planning for eventual conversion to peritoneal dialysis. I spoke with the team at [facility]; we are now planning for PD training in [place] in early 1/2021. In the interim between discharge and PD training, he can receive outpatient dialysis TIW in [place].   (2)Hypercalcemia - largely due to hyperphosphatemia (associated Ca-PO4 complexing); likely also a component from hypovitaminosis D. This is a chronic phenomenon for him. Safer to treat this with dialysis (with a high calcium bath) than with IV calcium; risk for metastatic calcification given his hyperphosphatemia.  (3)Metabolic acidosis - renally mediated - will improve with HD. Would not give IV NaHCO3 here, as it would place him at increased risk of complications from hypocalcemia  (4)Anemia - renally mediated   RECOMMEND: (1)Shiley placement as planned with IR; eventual conversion to tunneled cath with IR, later during the admissino (2)HD x each of the next 3 days (3)Phoslo as ordered/renal diet, once allowed to take PO (4)Retacrit with HD, as ordered (5)PD catheter placement early next week with Surgery Dr. Lam Kelly (6)F/U with SW regarding setup of outside dialysis at [place]   Kong Mccarthy MD [facility] Office: [phone] Cell: [phone]       Seen on HD - well-tolerating   VITAL: T(C): , Max: 37.1 (12-03-20 @ 05:02) T(F): , Max: 98.7 (12-03-20 @ 05:02) HR: 72 (12-03-20 @ 09:02) BP: 132/87 (12-03-20 @ 09:02) RR: 17 (12-03-20 @ 09:02) SpO2: 98% (12-03-20 @ 09:02)   PHYSICAL EXAM: Constitutional: NAD, Alert HEENT: NCAT, MMM Neck: Supple, No JVD Respiratory: CTA-b/l Cardiovascular: RRR s1s2, no m/r/g Gastrointestinal: BS+, soft, NT/ND Extremities: No peripheral edema b/l Neurological: no focal deficits; strength grossly intact Back: no CVAT b/l Skin: No rashes, no nevi Access: MARIO zee - accessed  LABS:                      8.6   6.00  )-----------( 166      ( 03 Dec 2020 05:23 )            25.3   Na(139)/K(3.8)/Cl(102)/HCO3(15)/BUN(103)/Cr(13.65)Glu(107)/Ca(6.4)/Mg(1.7)/PO4(7.0)    12-03 @ 05:23 Na(139)/K(4.1)/Cl(101)/HCO3(17)/BUN(102)/Cr(14.25)Glu(111)/Ca(6.5)/Mg(--)/PO4(--)    12-02 @ 14:45   IMPRESSION: 49M w/ HTN, hypothyroidism, and CKD5, 12/2/20 a/w uremia  (1)Renal - CKD5; uremic. S/p shiley placement today with IR, and now undergoing 1st HD. Planning for eventual conversion to peritoneal dialysis. I spoke with the team at [facility]; we are now planning for PD training in [place] in early 1/2021. In the interim between discharge and PD training, he can receive outpatient dialysis TIW in [place].   (2)Hypercalcemia - largely due to hyperphosphatemia (associated Ca-PO4 complexing); likely also a component from hypovitaminosis D. This is a chronic phenomenon for him. Safer to treat this with dialysis (with a high calcium bath) than with IV calcium; risk for metastatic calcification given his hyperphosphatemia.  (3)Metabolic acidosis - renally mediated - will improve with HD. Would not give IV NaHCO3 here, as it would place him at increased risk of complications from hypocalcemia  (4)Anemia - renally mediated   RECOMMEND: (1)HD x each of the next 3 days (2)Phoslo as ordered/renal diet, once allowed to take PO (3)Retacrit with HD, as ordered (4)PD catheter placement early next week with Surgery Dr. Lam Kelly (5)F/U with SW regarding setup of outside dialysis at [place]   Kong Mccarthy MD [facility] Office: [phone] Cell: [phone]

## 2020-12-03 NOTE — PROGRESS NOTE ADULT - ASSESSMENT
49 year old man requiring dialysis  - continue supportive care per primary team  - HD per nephrology  - OR planning for laparoscopic placement of peritoneal dialysis catheter next week by Dr. Lam Kelly

## 2020-12-03 NOTE — PROGRESS NOTE ADULT - SUBJECTIVE AND OBJECTIVE BOX
FEels well  S/p rt REYNALDO zee this morning, tolerated well    Vital Signs Last 24 Hrs  T(C): 36.8 (03 Dec 2020 09:02), Max: 37.1 (03 Dec 2020 05:02)  T(F): 98.2 (03 Dec 2020 09:02), Max: 98.7 (03 Dec 2020 05:02)  HR: 72 (03 Dec 2020 09:02) (65 - 87)  BP: 132/87 (03 Dec 2020 09:02) (127/89 - 187/100)  BP(mean): --  RR: 17 (03 Dec 2020 09:02) (14 - 18)  SpO2: 98% (03 Dec 2020 09:02) (98% - 100%)    I&O's Summary      GENERAL: NAD, well-developed  HEAD:  Atraumatic, Normocephalic  EYES: EOMI, PERRLA, conjunctiva and sclera clear  NECK: Supple, No JVD  CHEST/LUNG: Clear to auscultation bilaterally; No wheeze  HEART: Regular rate and rhythm; No murmurs, rubs, or gallops  ABDOMEN: Soft, Nontender, Nondistended; Bowel sounds present  EXTREMITIES:  2+ Peripheral Pulses, No clubbing, cyanosis, or edema  PSYCH: AAOx3  NEUROLOGY: non-focal  SKIN: No rashes or lesions; rt REYNALDO zee, dressing c/d/i    LABS:                        8.6    6.00  )-----------( 166      ( 03 Dec 2020 05:23 )             25.3     12-03    139  |  102  |  103<H>  ----------------------------<  107<H>  3.8   |  15<L>  |  13.65<H>    Ca    6.4<LL>      03 Dec 2020 05:23  Phos  7.0     12-03  Mg     1.7     12-03    TPro  7.6  /  Alb  4.1  /  TBili  0.3  /  DBili  x   /  AST  11  /  ALT  13  /  AlkPhos  62  12-03    PT/INR - ( 02 Dec 2020 14:45 )   PT: 12.3 SEC;   INR: 1.07          PTT - ( 02 Dec 2020 14:45 )  PTT:30.2 SEC  CAPILLARY BLOOD GLUCOSE                RADIOLOGY & ADDITIONAL TESTS:    Imaging Personally Reviewed:  [x] YES  [ ] NO    Consultant(s) Notes Reviewed:  [x] YES  [ ] NO

## 2020-12-03 NOTE — PROGRESS NOTE ADULT - PROBLEM SELECTOR PLAN 1
- Monitor electrolytes and renal functions.  - S/p rt REYNALDO zee 12/3, appreciate IR   - HD plan per nephrology recs.  - Phos binder TID.  - Renal diet.  - Hold chemo dvt ppx until patient receives chavo placement.  - Consult surgery Dr. Lam Kelly for PD catheter placement in the am.

## 2020-12-04 LAB
ANION GAP SERPL CALC-SCNC: 17 MMO/L — HIGH (ref 7–14)
BUN SERPL-MCNC: 62 MG/DL — HIGH (ref 7–23)
CALCIUM SERPL-MCNC: 8.2 MG/DL — LOW (ref 8.4–10.5)
CHLORIDE SERPL-SCNC: 101 MMOL/L — SIGNIFICANT CHANGE UP (ref 98–107)
CO2 SERPL-SCNC: 21 MMOL/L — LOW (ref 22–31)
CREAT SERPL-MCNC: 9.87 MG/DL — HIGH (ref 0.5–1.3)
GLUCOSE SERPL-MCNC: 109 MG/DL — HIGH (ref 70–99)
HAV IGM SER-ACNC: NONREACTIVE — SIGNIFICANT CHANGE UP
HBV CORE AB SER-ACNC: NONREACTIVE — SIGNIFICANT CHANGE UP
HBV CORE IGM SER-ACNC: NONREACTIVE — SIGNIFICANT CHANGE UP
HBV SURFACE AB SER-ACNC: 3.8 MLU/ML — LOW
HBV SURFACE AG SER-ACNC: NEGATIVE — SIGNIFICANT CHANGE UP
HCT VFR BLD CALC: 28 % — LOW (ref 39–50)
HCV AB S/CO SERPL IA: 0.13 S/CO — SIGNIFICANT CHANGE UP (ref 0–0.99)
HCV AB SERPL-IMP: SIGNIFICANT CHANGE UP
HGB BLD-MCNC: 9.4 G/DL — LOW (ref 13–17)
MAGNESIUM SERPL-MCNC: 1.8 MG/DL — SIGNIFICANT CHANGE UP (ref 1.6–2.6)
MCHC RBC-ENTMCNC: 28.9 PG — SIGNIFICANT CHANGE UP (ref 27–34)
MCHC RBC-ENTMCNC: 33.6 % — SIGNIFICANT CHANGE UP (ref 32–36)
MCV RBC AUTO: 86.2 FL — SIGNIFICANT CHANGE UP (ref 80–100)
MRSA PCR RESULT.: SIGNIFICANT CHANGE UP
NRBC # FLD: 0 K/UL — SIGNIFICANT CHANGE UP (ref 0–0)
PHOSPHATE SERPL-MCNC: 5.3 MG/DL — HIGH (ref 2.5–4.5)
PLATELET # BLD AUTO: 171 K/UL — SIGNIFICANT CHANGE UP (ref 150–400)
PMV BLD: 9.4 FL — SIGNIFICANT CHANGE UP (ref 7–13)
POTASSIUM SERPL-MCNC: 3.7 MMOL/L — SIGNIFICANT CHANGE UP (ref 3.5–5.3)
POTASSIUM SERPL-SCNC: 3.7 MMOL/L — SIGNIFICANT CHANGE UP (ref 3.5–5.3)
RBC # BLD: 3.25 M/UL — LOW (ref 4.2–5.8)
RBC # FLD: 12.9 % — SIGNIFICANT CHANGE UP (ref 10.3–14.5)
S AUREUS DNA NOSE QL NAA+PROBE: DETECTED — HIGH
SODIUM SERPL-SCNC: 139 MMOL/L — SIGNIFICANT CHANGE UP (ref 135–145)
WBC # BLD: 7.59 K/UL — SIGNIFICANT CHANGE UP (ref 3.8–10.5)
WBC # FLD AUTO: 7.59 K/UL — SIGNIFICANT CHANGE UP (ref 3.8–10.5)

## 2020-12-04 PROCEDURE — 99231 SBSQ HOSP IP/OBS SF/LOW 25: CPT

## 2020-12-04 PROCEDURE — 93010 ELECTROCARDIOGRAM REPORT: CPT

## 2020-12-04 RX ADMIN — Medication 2001 MILLIGRAM(S): at 07:52

## 2020-12-04 RX ADMIN — HEPARIN SODIUM 5000 UNIT(S): 5000 INJECTION INTRAVENOUS; SUBCUTANEOUS at 22:43

## 2020-12-04 RX ADMIN — CHLORHEXIDINE GLUCONATE 1 APPLICATION(S): 213 SOLUTION TOPICAL at 06:19

## 2020-12-04 RX ADMIN — Medication 50 MILLIGRAM(S): at 06:18

## 2020-12-04 RX ADMIN — Medication 2001 MILLIGRAM(S): at 12:10

## 2020-12-04 RX ADMIN — HEPARIN SODIUM 5000 UNIT(S): 5000 INJECTION INTRAVENOUS; SUBCUTANEOUS at 12:10

## 2020-12-04 RX ADMIN — Medication 112 MICROGRAM(S): at 06:19

## 2020-12-04 RX ADMIN — Medication 2001 MILLIGRAM(S): at 17:56

## 2020-12-04 NOTE — PROGRESS NOTE ADULT - SUBJECTIVE AND OBJECTIVE BOX
GENERAL SURGERY PROGRESS NOTE    SUBJECTIVE  Patient seen and examined. No acute events overnight.   very nervous about dialysis "I don't want to jump into anything" but understands he needs it and plans to talk to nephrology in more detail today  +void  tolerating diet  denies past abdominal surgery hx, has had rhinoplasty in the past      OBJECTIVE    PHYSICAL EXAM  General: Appears well, NAD, anxious  CHEST: breathing comfortably  CV: appears well perfused  Abdomen: soft, nontender, nondistended, no rebound or guarding  Extremities: Grossly symmetric    T(C): 37.1 (12-04-20 @ 06:15), Max: 37.2 (12-03-20 @ 16:55)  HR: 80 (12-04-20 @ 06:15) (70 - 80)  BP: 130/92 (12-04-20 @ 06:15) (130/92 - 151/88)  RR: 18 (12-04-20 @ 06:15) (17 - 18)  SpO2: 98% (12-04-20 @ 06:15) (98% - 100%)    12-03-20 @ 07:01  -  12-04-20 @ 07:00  --------------------------------------------------------  IN: 400 mL / OUT: 400 mL / NET: 0 mL        MEDICATIONS  calcium acetate 2001 milliGRAM(s) Oral three times a day with meals  chlorhexidine 4% Liquid 1 Application(s) Topical <User Schedule>  epoetin demetrio-epbx (RETACRIT) Injectable 44761 Unit(s) IV Push <User Schedule>  heparin   Injectable 5000 Unit(s) SubCutaneous every 8 hours  levothyroxine 112 MICROGram(s) Oral daily  metoprolol succinate ER 50 milliGRAM(s) Oral daily  sodium chloride 0.9% lock flush 10 milliLiter(s) IV Push every 1 hour PRN      LABS                        8.6    6.00  )-----------( 166      ( 03 Dec 2020 05:23 )             25.3     12-03    139  |  102  |  103<H>  ----------------------------<  107<H>  3.8   |  15<L>  |  13.65<H>    Ca    6.4<LL>      03 Dec 2020 05:23  Phos  7.0     12-03  Mg     1.7     12-03    TPro  7.6  /  Alb  4.1  /  TBili  0.3  /  DBili  x   /  AST  11  /  ALT  13  /  AlkPhos  62  12-03    PT/INR - ( 02 Dec 2020 14:45 )   PT: 12.3 SEC;   INR: 1.07          PTT - ( 02 Dec 2020 14:45 )  PTT:30.2 SEC      RADIOLOGY & ADDITIONAL STUDIES

## 2020-12-04 NOTE — PROGRESS NOTE ADULT - ASSESSMENT
48y/o with hx of hypothyroidism, HTN, chronic glomerulonephritis now progress to ESRD, admitted for dialysis initiation surgery consulted for PD catheter placement    s/p IR yayo placement 12/3    patient is very anxious today about dialysis    - will plan for PD catheter placement next week pending OR availability   will update primary team when surgery is scheduled  - medical optimization for surgery  - appreciate nephrology recommendations    A TEAM SURGERY  [id number]

## 2020-12-04 NOTE — PROGRESS NOTE ADULT - SUBJECTIVE AND OBJECTIVE BOX
Overnight events noted   VITAL: T(C): , Max: 37.2 (12-03-20 @ 16:55) T(F): , Max: 99 (12-03-20 @ 16:55) HR: 80 (12-04-20 @ 06:15) BP: 130/92 (12-04-20 @ 06:15) RR: 18 (12-04-20 @ 06:15) SpO2: 98% (12-04-20 @ 06:15)   PHYSICAL EXAM: Constitutional: NAD, Alert HEENT: NCAT, MMM Neck: Supple, No JVD Respiratory: CTA-b/l Cardiovascular: RRR s1s2, no m/r/g Gastrointestinal: BS+, soft, NT/ND Extremities: No peripheral edema b/l Neurological: no focal deficits; strength grossly intact Back: no CVAT b/l Skin: No rashes, no nevi Access: [facility]    LABS:                      9.4   7.59  )-----------( 171      ( 04 Dec 2020 05:47 )            28.0   Na(139)/K(3.7)/Cl(101)/HCO3(21)/BUN(62)/Cr(9.87)Glu(109)/Ca(8.2)/Mg(1.8)/PO4(5.3)    12-04 @ 05:47 Na(139)/K(3.8)/Cl(102)/HCO3(15)/BUN(103)/Cr(13.65)Glu(107)/Ca(6.4)/Mg(1.7)/PO4(7.0)    12-03 @ 05:23 Na(139)/K(4.1)/Cl(101)/HCO3(17)/BUN(102)/Cr(14.25)Glu(111)/Ca(6.5)/Mg(--)/PO4(--)    12-02 @ 14:45   IMPRESSION: 49M w/ HTN, hypothyroidism, and CKD5, 12/2/20 a/w uremia  (1)Renal - newly ESRD as of this admission. S/p 1st HD yesterday; for 2nd HD today and 3rd HD tomorrow. For eventual conversion to peritoneal dialysis at [facility] in 1/2021. In the interim between discharge and PD training, he can receive outpatient dialysis TIW in [place].   (2)Hypercalcemia - improving, with HD and with Phoslo  (3)Hyperphosphatemia - improved, on Phoslo/HD  (4)Metabolic acidosis - improved, with HD  (4)Anemia - renally mediated; on Retacrit with HD   RECOMMEND: (1)HD today + tomorrow as ordered (2)Phoslo as ordered (3)PD catheter placement early next week with Surgery Dr. Lam Kelly (4)Conversion of shiley to tunneled cath next week with IR (5)F/U with SW regarding setup of outside dialysis at [place]      Kong Mccarthy MD [facility] Office: [phone] Cell: [phone]       Significantly improved energy, s/p HD yesterday. Appetite improved as well.   VITAL: T(C): , Max: 37.2 (12-03-20 @ 16:55) T(F): , Max: 99 (12-03-20 @ 16:55) HR: 80 (12-04-20 @ 06:15) BP: 130/92 (12-04-20 @ 06:15) RR: 18 (12-04-20 @ 06:15) SpO2: 98% (12-04-20 @ 06:15)   PHYSICAL EXAM: Constitutional: NAD, Alert HEENT: NCAT, MMM Neck: Supple, No JVD Respiratory: CTA-b/l Cardiovascular: RRR s1s2, no m/r/g Gastrointestinal: BS+, soft, NT/ND Extremities: No peripheral edema b/l Neurological: no focal deficits; strength grossly intact Back: no CVAT b/l Skin: No rashes, no nevi Access: [facility]    LABS:                      9.4   7.59  )-----------( 171      ( 04 Dec 2020 05:47 )            28.0   Na(139)/K(3.7)/Cl(101)/HCO3(21)/BUN(62)/Cr(9.87)Glu(109)/Ca(8.2)/Mg(1.8)/PO4(5.3)    12-04 @ 05:47 Na(139)/K(3.8)/Cl(102)/HCO3(15)/BUN(103)/Cr(13.65)Glu(107)/Ca(6.4)/Mg(1.7)/PO4(7.0)    12-03 @ 05:23 Na(139)/K(4.1)/Cl(101)/HCO3(17)/BUN(102)/Cr(14.25)Glu(111)/Ca(6.5)/Mg(--)/PO4(--)    12-02 @ 14:45   IMPRESSION: 49M w/ HTN, hypothyroidism, and CKD5, 12/2/20 a/w uremia  (1)Renal - newly ESRD as of this admission. S/p 1st HD yesterday; for 2nd HD today and 3rd HD tomorrow. For eventual conversion to peritoneal dialysis at [facility] in 1/2021. In the interim between discharge and PD training, he can receive outpatient dialysis TIW in [place].   (2)Hypercalcemia - improving, with HD and with Phoslo  (3)Hyperphosphatemia - improved, on Phoslo/HD  (4)Metabolic acidosis - improved, with HD  (4)Anemia - renally mediated; on Retacrit with HD   RECOMMEND: (1)HD today + tomorrow as ordered (2)Phoslo as ordered (3)PD catheter placement early next week with Surgery Dr. Lam Kelly (4)Conversion of shiley to tunneled cath next week with IR (5)F/U with SW regarding setup of outside dialysis at [place]      Kong Mccarthy MD [facility] Office: [phone] Cell: [phone]

## 2020-12-04 NOTE — CHART NOTE - NSCHARTNOTEFT_GEN_A_CORE
[facility] MEDICINE NIGHT COVERAGE - Medicine Subsequent Hospital Care Note    CC: Bradycardia    HPI/Subjective: Notified by HD RN that patients HR is 46. Patient seen and examined at bedside. Per RN, on the HD machine, HR reading as 42. EKG machine at bedside, shows HR in 70-80. Patient  denies generalized weakness, headache,  dizziness, chest pain, palpitations, shortness of breath, numbness/weakness/tingling, any other complaints.     Vital Signs Last 24 Hrs  T(C): 37 (12-04-20 @ 18:50), Max: 37.3 (12-04-20 @ 14:00)  T(F): 98.6 (12-04-20 @ 18:50), Max: 99.1 (12-04-20 @ 14:00)  HR: 82 (12-04-20 @ 14:00) (80 - 82)  BP: 156/92 (12-04-20 @ 18:50) (128/90 - 156/92)  RR: 18 (12-04-20 @ 18:50) (18 - 18)  SpO2: 99% (12-04-20 @ 14:00) (98% - 100%) on (O2)    PHYSICAL EXAM:  Constitutional: NAD, well-developed, well-nourished  Respiratory: Clear to auscultation bilaterally. Normal respiratory effort  Cardiovascular: regular rate and irregular rhythm, S1 and S2  Gastrointestinal: soft, nontender, nondistended, +bowel sounds      LABS:                        9.4    7.59  )-----------( 171      ( 04 Dec 2020 05:47 )             28.0     12-04    139  |  101  |  62<H>  ----------------------------<  109<H>  3.7   |  21<L>  |  9.87<H>    Ca    8.2<L>      04 Dec 2020 05:47  Phos  5.3     12-04  Mg     1.8     12-04    TPro  7.6  /  Alb  4.1  /  TBili  0.3  /  DBili  x   /  AST  11  /  ALT  13  /  AlkPhos  62  12-03    PT/INR: PT: 12.3 SEC | INR: 1.07 (12-02-20 @ 14:45)  PTT: 30.2 SEC (12-02-20 @ 14:45)    Blood Gas Venous (12-04-20 @ 21:00):  pH: 7.28 | HCO3: 17 | pCO2: 38 | pO2: 38 | Lactate: 0.7  pH: 7.27 | HCO3: 18 | pCO2: 42 | pO2: 42 | Lactate: 0.9    COVID PCR:   NotDetec (12-02-20 @ 17:51)      RADIOLOGY:    MEDICATIONS  (STANDING):  calcium acetate 2001 milliGRAM(s) Oral three times a day with meals  chlorhexidine 4% Liquid 1 Application(s) Topical <User Schedule>  epoetin demetrio-epbx (RETACRIT) Injectable 70145 Unit(s) IV Push <User Schedule>  heparin   Injectable 5000 Unit(s) SubCutaneous every 8 hours  levothyroxine 112 MICROGram(s) Oral daily  metoprolol succinate ER 50 milliGRAM(s) Oral daily    MEDICATIONS  (PRN):  sodium chloride 0.9% lock flush 10 milliLiter(s) IV Push every 1 hour PRN Pre/post blood products, medications, blood draw, and to maintain line patency    ASSESSMENT/PLAN: 50 yo M hx hypertension, hypothyroidism, and chronic glomerulonephritis and CKD/ESRD sent in from nephrology office for HD. Now with episode of bradycardia.  -EKG performed- NSR with PVCs at rate of 76bpm  -Continue toprol XL  -Willl check electrolytes early in am as patient joe Mrashall PA-C  Medicine [id number]  [ ] Low complexity/risk (Time < __min) [facility] MEDICINE NIGHT COVERAGE - Medicine Subsequent Hospital Care Note    CC: Bradycardia    HPI/Subjective: Notified by HD RN that patients HR is 46. Patient seen and examined at bedside. Per RN, on the HD machine, HR reading as 42. EKG machine at bedside, shows HR in 70-80. Patient  denies generalized weakness, headache,  dizziness, chest pain, palpitations, shortness of breath, numbness/weakness/tingling, any other complaints.     Vital Signs Last 24 Hrs  T(C): 37 (12-04-20 @ 18:50), Max: 37.3 (12-04-20 @ 14:00)  T(F): 98.6 (12-04-20 @ 18:50), Max: 99.1 (12-04-20 @ 14:00)  HR: 82 (12-04-20 @ 14:00) (80 - 82)  BP: 156/92 (12-04-20 @ 18:50) (128/90 - 156/92)  RR: 18 (12-04-20 @ 18:50) (18 - 18)  SpO2: 99% (12-04-20 @ 14:00) (98% - 100%) on (O2)    MEDICATIONS  (STANDING):  calcium acetate 2001 milliGRAM(s) Oral three times a day with meals  chlorhexidine 4% Liquid 1 Application(s) Topical <User Schedule>  epoetin demetrio-epbx (RETACRIT) Injectable 45876 Unit(s) IV Push <User Schedule>  heparin   Injectable 5000 Unit(s) SubCutaneous every 8 hours  levothyroxine 112 MICROGram(s) Oral daily  metoprolol succinate ER 50 milliGRAM(s) Oral daily    MEDICATIONS  (PRN):  sodium chloride 0.9% lock flush 10 milliLiter(s) IV Push every 1 hour PRN Pre/post blood products, medications, blood draw, and to maintain line patency  PHYSICAL EXAM:  Constitutional: NAD, well-developed, well-nourished  Respiratory: Clear to auscultation bilaterally. Normal respiratory effort  Cardiovascular: regular rate and irregular rhythm, S1 and S2  Gastrointestinal: soft, nontender, nondistended, +bowel sounds    ASSESSMENT/PLAN: 48 yo M hx hypertension, hypothyroidism, and chronic glomerulonephritis and CKD/ESRD sent in from nephrology office for HD. Now with episode of bradycardia.  -EKG performed- NSR with PVCs at rate of 76bpm  -Continue toprol XL  -Will monitor electrolytes as well  -Continue to monitor patient closely    Kareen Marshall PA-C  Medicine [id number]    [ ] Low complexity/risk (Time < 15min)

## 2020-12-04 NOTE — PROGRESS NOTE ADULT - SUBJECTIVE AND OBJECTIVE BOX
Transient nausea earlier this morning  NO acute SOB or CP    Vital Signs Last 24 Hrs  T(C): 37.1 (04 Dec 2020 06:15), Max: 37.2 (03 Dec 2020 16:55)  T(F): 98.8 (04 Dec 2020 06:15), Max: 99 (03 Dec 2020 16:55)  HR: 80 (04 Dec 2020 06:15) (70 - 80)  BP: 130/92 (04 Dec 2020 06:15) (130/92 - 151/88)  BP(mean): --  RR: 18 (04 Dec 2020 06:15) (17 - 18)  SpO2: 98% (04 Dec 2020 06:15) (98% - 100%)    I&O's Summary    12-03-20 @ 07:01  -  12-04-20 @ 07:00  --------------------------------------------------------  IN: 400 mL / OUT: 400 mL / NET: 0 mL        GENERAL: NAD, well-developed  HEAD:  Atraumatic, Normocephalic  EYES: EOMI, PERRLA, conjunctiva and sclera clear  NECK: Supple, No JVD  CHEST/LUNG: Clear to auscultation bilaterally; No wheeze  HEART: Regular rate and rhythm; No murmurs, rubs, or gallops  ABDOMEN: Soft, Nontender, Nondistended; Bowel sounds present  EXTREMITIES:  2+ Peripheral Pulses, No clubbing, cyanosis, or edema  PSYCH: AAOx3  NEUROLOGY: non-focal  SKIN: No rashes or lesions; rt IJ ria zee c/d/i    LABS:                        9.4    7.59  )-----------( 171      ( 04 Dec 2020 05:47 )             28.0     12-04    139  |  101  |  62<H>  ----------------------------<  109<H>  3.7   |  21<L>  |  9.87<H>    Ca    8.2<L>      04 Dec 2020 05:47  Phos  5.3     12-04  Mg     1.8     12-04    TPro  7.6  /  Alb  4.1  /  TBili  0.3  /  DBili  x   /  AST  11  /  ALT  13  /  AlkPhos  62  12-03    PT/INR - ( 02 Dec 2020 14:45 )   PT: 12.3 SEC;   INR: 1.07          PTT - ( 02 Dec 2020 14:45 )  PTT:30.2 SEC  CAPILLARY BLOOD GLUCOSE                RADIOLOGY & ADDITIONAL TESTS:    Imaging Personally Reviewed:  [x] YES  [ ] NO    Consultant(s) Notes Reviewed:  [x] YES  [ ] NO

## 2020-12-04 NOTE — PROGRESS NOTE ADULT - PROBLEM SELECTOR PLAN 1
- Monitor electrolytes and renal functions.  - S/p rt REYNALDO zee 12/3, appreciate IR   - HD plan per nephrology recs.  - Phos binder TID.  - Renal diet.  - Hold chemo dvt ppx until patient receives chavo placement.  - Consulted surgery Dr. Lam Kelly for PD catheter placement next week  - Will need IR consult next week for tunneled HD catehter as well

## 2020-12-05 LAB
ANION GAP SERPL CALC-SCNC: 14 MMO/L — SIGNIFICANT CHANGE UP (ref 7–14)
BUN SERPL-MCNC: 33 MG/DL — HIGH (ref 7–23)
CALCIUM SERPL-MCNC: 9.3 MG/DL — SIGNIFICANT CHANGE UP (ref 8.4–10.5)
CHLORIDE SERPL-SCNC: 100 MMOL/L — SIGNIFICANT CHANGE UP (ref 98–107)
CO2 SERPL-SCNC: 24 MMOL/L — SIGNIFICANT CHANGE UP (ref 22–31)
CREAT SERPL-MCNC: 6.26 MG/DL — HIGH (ref 0.5–1.3)
GLUCOSE SERPL-MCNC: 99 MG/DL — SIGNIFICANT CHANGE UP (ref 70–99)
HCT VFR BLD CALC: 29.2 % — LOW (ref 39–50)
HGB BLD-MCNC: 9.7 G/DL — LOW (ref 13–17)
MAGNESIUM SERPL-MCNC: 1.8 MG/DL — SIGNIFICANT CHANGE UP (ref 1.6–2.6)
MCHC RBC-ENTMCNC: 28.5 PG — SIGNIFICANT CHANGE UP (ref 27–34)
MCHC RBC-ENTMCNC: 33.2 % — SIGNIFICANT CHANGE UP (ref 32–36)
MCV RBC AUTO: 85.9 FL — SIGNIFICANT CHANGE UP (ref 80–100)
NRBC # FLD: 0 K/UL — SIGNIFICANT CHANGE UP (ref 0–0)
PHOSPHATE SERPL-MCNC: 3.5 MG/DL — SIGNIFICANT CHANGE UP (ref 2.5–4.5)
PLATELET # BLD AUTO: 178 K/UL — SIGNIFICANT CHANGE UP (ref 150–400)
PMV BLD: 9.7 FL — SIGNIFICANT CHANGE UP (ref 7–13)
POTASSIUM SERPL-MCNC: 3.8 MMOL/L — SIGNIFICANT CHANGE UP (ref 3.5–5.3)
POTASSIUM SERPL-SCNC: 3.8 MMOL/L — SIGNIFICANT CHANGE UP (ref 3.5–5.3)
RBC # BLD: 3.4 M/UL — LOW (ref 4.2–5.8)
RBC # FLD: 12.9 % — SIGNIFICANT CHANGE UP (ref 10.3–14.5)
SODIUM SERPL-SCNC: 138 MMOL/L — SIGNIFICANT CHANGE UP (ref 135–145)
WBC # BLD: 6.69 K/UL — SIGNIFICANT CHANGE UP (ref 3.8–10.5)
WBC # FLD AUTO: 6.69 K/UL — SIGNIFICANT CHANGE UP (ref 3.8–10.5)

## 2020-12-05 RX ORDER — AMLODIPINE BESYLATE 2.5 MG/1
5 TABLET ORAL DAILY
Refills: 0 | Status: DISCONTINUED | OUTPATIENT
Start: 2020-12-05 | End: 2020-12-11

## 2020-12-05 RX ADMIN — Medication 112 MICROGRAM(S): at 05:30

## 2020-12-05 RX ADMIN — Medication 2001 MILLIGRAM(S): at 12:57

## 2020-12-05 RX ADMIN — AMLODIPINE BESYLATE 5 MILLIGRAM(S): 2.5 TABLET ORAL at 12:56

## 2020-12-05 RX ADMIN — Medication 2001 MILLIGRAM(S): at 07:58

## 2020-12-05 RX ADMIN — HEPARIN SODIUM 5000 UNIT(S): 5000 INJECTION INTRAVENOUS; SUBCUTANEOUS at 12:57

## 2020-12-05 RX ADMIN — CHLORHEXIDINE GLUCONATE 1 APPLICATION(S): 213 SOLUTION TOPICAL at 05:30

## 2020-12-05 RX ADMIN — Medication 2001 MILLIGRAM(S): at 16:54

## 2020-12-05 RX ADMIN — HEPARIN SODIUM 5000 UNIT(S): 5000 INJECTION INTRAVENOUS; SUBCUTANEOUS at 05:30

## 2020-12-05 RX ADMIN — ERYTHROPOIETIN 10000 UNIT(S): 10000 INJECTION, SOLUTION INTRAVENOUS; SUBCUTANEOUS at 23:25

## 2020-12-05 RX ADMIN — Medication 50 MILLIGRAM(S): at 05:30

## 2020-12-05 RX ADMIN — HEPARIN SODIUM 5000 UNIT(S): 5000 INJECTION INTRAVENOUS; SUBCUTANEOUS at 19:31

## 2020-12-05 NOTE — PROGRESS NOTE ADULT - ASSESSMENT
49M w/ HTN, hypothyroidism, and CKD5, 12/2/20 a/w uremia    (1)Renal - newly ESRD as of this admission. S/p 1st HD 12/3, 2nd HD yesterday, and 3rd HD today. For eventual conversion to peritoneal dialysis at [facility] in 1/2021. In the interim between discharge and PD training, he can receive outpatient dialysis TIW in [place].     (2)Hypocalcemia - improving, with HD and with Phoslo    (3)Hyperphosphatemia - improved, on Phoslo/HD    (4)Metabolic acidosis - improved, with HD    (4)Anemia - renally mediated; on Retacrit with HD      RECOMMEND:  (1) complete HD today  (#3) as ordered       Next HD would be on Tuesday   (2) continue Phoslo as ordered  (3) planning PD catheter placement early next week with Surgery Dr. Lam Kelly  (4) planning conversion of shiley to tunneled cath next week with IR  (5) F/U with SW regarding setup of outside dialysis at [place]

## 2020-12-05 NOTE — PROGRESS NOTE ADULT - SUBJECTIVE AND OBJECTIVE BOX
GENERAL SURGERY PROGRESS NOTE    SUBJECTIVE  chart reviewed  tolerated HD        OBJECTIVE    PHYSICAL EXAM  not examined    T(C): 36.8 (12-05-20 @ 05:26), Max: 37.4 (12-04-20 @ 22:35)  HR: 86 (12-05-20 @ 05:26) (72 - 86)  BP: 131/96 (12-05-20 @ 05:26) (128/90 - 158/82)  RR: 18 (12-05-20 @ 05:26) (18 - 18)  SpO2: 98% (12-05-20 @ 05:26) (98% - 99%)    12-03-20 @ 07:01  -  12-04-20 @ 07:00  --------------------------------------------------------  IN: 400 mL / OUT: 400 mL / NET: 0 mL    12-04-20 @ 07:01  -  12-05-20 @ 06:39  --------------------------------------------------------  IN: 400 mL / OUT: 400 mL / NET: 0 mL        MEDICATIONS  calcium acetate 2001 milliGRAM(s) Oral three times a day with meals  chlorhexidine 4% Liquid 1 Application(s) Topical <User Schedule>  epoetin demetrio-epbx (RETACRIT) Injectable 82935 Unit(s) IV Push <User Schedule>  heparin   Injectable 5000 Unit(s) SubCutaneous every 8 hours  levothyroxine 112 MICROGram(s) Oral daily  metoprolol succinate ER 50 milliGRAM(s) Oral daily  sodium chloride 0.9% lock flush 10 milliLiter(s) IV Push every 1 hour PRN      LABS                        9.7    6.69  )-----------( 178      ( 05 Dec 2020 04:40 )             29.2     12-05    138  |  100  |  33<H>  ----------------------------<  99  3.8   |  24  |  6.26<H>    Ca    9.3      05 Dec 2020 04:40  Phos  3.5     12-05  Mg     1.8     12-05            RADIOLOGY & ADDITIONAL STUDIES

## 2020-12-05 NOTE — PROGRESS NOTE ADULT - SUBJECTIVE AND OBJECTIVE BOX
Feels well  Slept well overnight    Vital Signs Last 24 Hrs  T(C): 36.8 (05 Dec 2020 05:26), Max: 37.4 (04 Dec 2020 22:35)  T(F): 98.2 (05 Dec 2020 05:26), Max: 99.4 (04 Dec 2020 22:35)  HR: 86 (05 Dec 2020 05:26) (72 - 86)  BP: 131/96 (05 Dec 2020 05:26) (128/90 - 158/82)  BP(mean): --  RR: 18 (05 Dec 2020 05:26) (18 - 18)  SpO2: 98% (05 Dec 2020 05:26) (98% - 99%)    I&O's Summary    12-04-20 @ 07:01  -  12-05-20 @ 07:00  --------------------------------------------------------  IN: 400 mL / OUT: 400 mL / NET: 0 mL        GENERAL: NAD, well-developed  HEAD:  Atraumatic, Normocephalic  EYES: EOMI, PERRLA, conjunctiva and sclera clear  NECK: Supple, No JVD  CHEST/LUNG: Clear to auscultation bilaterally; No wheeze  HEART: Regular rate and rhythm; No murmurs, rubs, or gallops  ABDOMEN: Soft, Nontender, Nondistended; Bowel sounds present  EXTREMITIES:  2+ Peripheral Pulses, No clubbing, cyanosis, or edema  PSYCH: AAOx3  NEUROLOGY: non-focal  SKIN: No rashes or lesions; rt IJ ria zee c/d/i    LABS:                        9.7    6.69  )-----------( 178      ( 05 Dec 2020 04:40 )             29.2     12-05    138  |  100  |  33<H>  ----------------------------<  99  3.8   |  24  |  6.26<H>    Ca    9.3      05 Dec 2020 04:40  Phos  3.5     12-05  Mg     1.8     12-05        CAPILLARY BLOOD GLUCOSE                RADIOLOGY & ADDITIONAL TESTS:    Imaging Personally Reviewed:  [x] YES  [ ] NO    Consultant(s) Notes Reviewed:  [x] YES  [ ] NO

## 2020-12-05 NOTE — PROGRESS NOTE ADULT - PROBLEM SELECTOR PLAN 1
- Monitor electrolytes and renal functions.  - S/p rt REYNALDO zee 12/3, appreciate IR   - HD plan per nephrology recs.  - Phos binder TID.  - Renal diet.  - Hold chemo dvt ppx until patient receives chavo placement.  - For PD catheter placement next week --> he is medically cleared for OR  - Will need IR consult next week for tunneled HD catheter as well

## 2020-12-05 NOTE — PROGRESS NOTE ADULT - SUBJECTIVE AND OBJECTIVE BOX
Patient seen and examined in bed.  He is waiting for HD (#3) as ordered. Last HD was yesterday (#2) after #1 HD on 12/3.  Patient will have him on PD in January .    REVIEW OF SYSTEMS:  As per HPI, otherwise 8 full 10 ROS were unremarkable    MEDICATIONS  (STANDING):  amLODIPine   Tablet 5 milliGRAM(s) Oral daily  calcium acetate 2001 milliGRAM(s) Oral three times a day with meals  chlorhexidine 4% Liquid 1 Application(s) Topical <User Schedule>  epoetin demetrio-epbx (RETACRIT) Injectable 68520 Unit(s) IV Push <User Schedule>  heparin   Injectable 5000 Unit(s) SubCutaneous every 8 hours  levothyroxine 112 MICROGram(s) Oral daily  metoprolol succinate ER 50 milliGRAM(s) Oral daily      VITAL:  T(C): , Max: 37.4 (12-04-20 @ 22:35)  T(F): , Max: 99.4 (12-04-20 @ 22:35)  HR: 69 (12-05-20 @ 12:54)  BP: 127/90 (12-05-20 @ 12:54)  BP(mean): --  RR: 18 (12-05-20 @ 12:54)  SpO2: 97% (12-05-20 @ 12:54)  Wt(kg): --    I and O's:    12-04 @ 07:01  -  12-05 @ 07:00  --------------------------------------------------------  IN: 400 mL / OUT: 400 mL / NET: 0 mL    12-05 @ 07:01  -  12-05 @ 21:05  --------------------------------------------------------  IN: 0 mL / OUT: 550 mL / NET: -550 mL          PHYSICAL EXAM:    Constitutional: NAD  Neck: No JVD  Respiratory: CTAB  Cardiovascular: S1 and S2  Gastrointestinal: BS+, soft, NT/ND  Extremities: No peripheral edema  Neurological: A/O x 3, no focal deficits  Psychiatric: Normal mood, normal affect  : No Galvez  Access: RT [facility]    LABS:                        9.7    6.69  )-----------( 178      ( 05 Dec 2020 04:40 )             29.2     12-05    138  |  100  |  33<H>  ----------------------------<  99  3.8   |  24  |  6.26<H>    Ca    9.3      05 Dec 2020 04:40  Phos  3.5     12-05  Mg     1.8     12-05

## 2020-12-05 NOTE — PROGRESS NOTE ADULT - PROVIDER SPECIALTY LIST ADULT
Surgery - resolved  - reported pain on urination 4/9, UA neg for signs of infection, no plans for treatment at this time, f/u [facility]

## 2020-12-05 NOTE — PROGRESS NOTE ADULT - ASSESSMENT
48y/o with hx of hypothyroidism, HTN, chronic glomerulonephritis now progress to ESRD, admitted for dialysis initiation surgery consulted for PD catheter placement    s/p IR shiley placement 12/3    Recommendations:  - will plan for PD catheter placement next week pending OR availability   will update primary team when surgery is scheduled  - medical optimization for surgery, please document medical clearance  - appreciate nephrology recommendations  patient getting permacath next week with IR  plans to start PD 1/2021    A TEAM SURGERY  [id number]

## 2020-12-06 LAB
ANION GAP SERPL CALC-SCNC: 10 MMOL/L — SIGNIFICANT CHANGE UP (ref 7–14)
BASOPHILS # BLD AUTO: 0.03 K/UL — SIGNIFICANT CHANGE UP (ref 0–0.2)
BASOPHILS NFR BLD AUTO: 0.4 % — SIGNIFICANT CHANGE UP (ref 0–2)
BUN SERPL-MCNC: 20 MG/DL — SIGNIFICANT CHANGE UP (ref 7–23)
CALCIUM SERPL-MCNC: 8.5 MG/DL — SIGNIFICANT CHANGE UP (ref 8.4–10.5)
CHLORIDE SERPL-SCNC: 97 MMOL/L — LOW (ref 98–107)
CO2 SERPL-SCNC: 26 MMOL/L — SIGNIFICANT CHANGE UP (ref 22–31)
CREAT SERPL-MCNC: 4.71 MG/DL — HIGH (ref 0.5–1.3)
EOSINOPHIL # BLD AUTO: 0.14 K/UL — SIGNIFICANT CHANGE UP (ref 0–0.5)
EOSINOPHIL NFR BLD AUTO: 2 % — SIGNIFICANT CHANGE UP (ref 0–6)
GLUCOSE SERPL-MCNC: 85 MG/DL — SIGNIFICANT CHANGE UP (ref 70–99)
HCT VFR BLD CALC: 27.3 % — LOW (ref 39–50)
HGB BLD-MCNC: 9.1 G/DL — LOW (ref 13–17)
IANC: 4.23 K/UL — SIGNIFICANT CHANGE UP (ref 1.5–8.5)
IMM GRANULOCYTES NFR BLD AUTO: 0.1 % — SIGNIFICANT CHANGE UP (ref 0–1.5)
LYMPHOCYTES # BLD AUTO: 1.86 K/UL — SIGNIFICANT CHANGE UP (ref 1–3.3)
LYMPHOCYTES # BLD AUTO: 26.5 % — SIGNIFICANT CHANGE UP (ref 13–44)
MAGNESIUM SERPL-MCNC: 1.9 MG/DL — SIGNIFICANT CHANGE UP (ref 1.6–2.6)
MCHC RBC-ENTMCNC: 29.5 PG — SIGNIFICANT CHANGE UP (ref 27–34)
MCHC RBC-ENTMCNC: 33.3 GM/DL — SIGNIFICANT CHANGE UP (ref 32–36)
MCV RBC AUTO: 88.6 FL — SIGNIFICANT CHANGE UP (ref 80–100)
MONOCYTES # BLD AUTO: 0.75 K/UL — SIGNIFICANT CHANGE UP (ref 0–0.9)
MONOCYTES NFR BLD AUTO: 10.7 % — SIGNIFICANT CHANGE UP (ref 2–14)
NEUTROPHILS # BLD AUTO: 4.23 K/UL — SIGNIFICANT CHANGE UP (ref 1.8–7.4)
NEUTROPHILS NFR BLD AUTO: 60.3 % — SIGNIFICANT CHANGE UP (ref 43–77)
NRBC # BLD: 0 /100 WBCS — SIGNIFICANT CHANGE UP
NRBC # FLD: 0 K/UL — SIGNIFICANT CHANGE UP
PHOSPHATE SERPL-MCNC: 1.9 MG/DL — LOW (ref 2.5–4.5)
PLATELET # BLD AUTO: 166 K/UL — SIGNIFICANT CHANGE UP (ref 150–400)
POTASSIUM SERPL-MCNC: 3.5 MMOL/L — SIGNIFICANT CHANGE UP (ref 3.5–5.3)
POTASSIUM SERPL-SCNC: 3.5 MMOL/L — SIGNIFICANT CHANGE UP (ref 3.5–5.3)
RBC # BLD: 3.08 M/UL — LOW (ref 4.2–5.8)
RBC # FLD: 13 % — SIGNIFICANT CHANGE UP (ref 10.3–14.5)
SODIUM SERPL-SCNC: 133 MMOL/L — LOW (ref 135–145)
WBC # BLD: 7.02 K/UL — SIGNIFICANT CHANGE UP (ref 3.8–10.5)
WBC # FLD AUTO: 7.02 K/UL — SIGNIFICANT CHANGE UP (ref 3.8–10.5)

## 2020-12-06 RX ORDER — SODIUM,POTASSIUM PHOSPHATES 278-250MG
1 POWDER IN PACKET (EA) ORAL ONCE
Refills: 0 | Status: COMPLETED | OUTPATIENT
Start: 2020-12-06 | End: 2020-12-06

## 2020-12-06 RX ADMIN — Medication 112 MICROGRAM(S): at 05:26

## 2020-12-06 RX ADMIN — Medication 2001 MILLIGRAM(S): at 08:28

## 2020-12-06 RX ADMIN — HEPARIN SODIUM 5000 UNIT(S): 5000 INJECTION INTRAVENOUS; SUBCUTANEOUS at 12:12

## 2020-12-06 RX ADMIN — Medication 1 TABLET(S): at 12:12

## 2020-12-06 RX ADMIN — HEPARIN SODIUM 5000 UNIT(S): 5000 INJECTION INTRAVENOUS; SUBCUTANEOUS at 22:01

## 2020-12-06 RX ADMIN — AMLODIPINE BESYLATE 5 MILLIGRAM(S): 2.5 TABLET ORAL at 05:27

## 2020-12-06 RX ADMIN — Medication 2001 MILLIGRAM(S): at 17:06

## 2020-12-06 RX ADMIN — CHLORHEXIDINE GLUCONATE 1 APPLICATION(S): 213 SOLUTION TOPICAL at 05:28

## 2020-12-06 RX ADMIN — HEPARIN SODIUM 5000 UNIT(S): 5000 INJECTION INTRAVENOUS; SUBCUTANEOUS at 03:40

## 2020-12-06 RX ADMIN — Medication 50 MILLIGRAM(S): at 05:27

## 2020-12-06 RX ADMIN — Medication 2001 MILLIGRAM(S): at 12:12

## 2020-12-06 NOTE — PROGRESS NOTE ADULT - SUBJECTIVE AND OBJECTIVE BOX
GENERAL SURGERY PROGRESS NOTE    SUBJECTIVE  chart reviewed  planned for HD next on 12/8    OBJECTIVE    PHYSICAL EXAM  not examined    T(C): 36.7 (12-06-20 @ 00:45), Max: 37 (12-06-20 @ 00:15)  HR: 88 (12-06-20 @ 00:45) (69 - 88)  BP: 136/90 (12-06-20 @ 00:45) (119/78 - 144/97)  RR: 18 (12-06-20 @ 00:45) (16 - 18)  SpO2: 98% (12-06-20 @ 00:45) (97% - 98%)    12-04-20 @ 07:01  -  12-05-20 @ 07:00  --------------------------------------------------------  IN: 400 mL / OUT: 400 mL / NET: 0 mL    12-05-20 @ 07:01  -  12-06-20 @ 05:03  --------------------------------------------------------  IN: 400 mL / OUT: 950 mL / NET: -550 mL        MEDICATIONS  amLODIPine   Tablet 5 milliGRAM(s) Oral daily  calcium acetate 2001 milliGRAM(s) Oral three times a day with meals  chlorhexidine 4% Liquid 1 Application(s) Topical <User Schedule>  epoetin demetrio-epbx (RETACRIT) Injectable 65423 Unit(s) IV Push <User Schedule>  heparin   Injectable 5000 Unit(s) SubCutaneous every 8 hours  levothyroxine 112 MICROGram(s) Oral daily  metoprolol succinate ER 50 milliGRAM(s) Oral daily  sodium chloride 0.9% lock flush 10 milliLiter(s) IV Push every 1 hour PRN      LABS                        9.7    6.69  )-----------( 178      ( 05 Dec 2020 04:40 )             29.2     12-05    138  |  100  |  33<H>  ----------------------------<  99  3.8   |  24  |  6.26<H>    Ca    9.3      05 Dec 2020 04:40  Phos  3.5     12-05  Mg     1.8     12-05            RADIOLOGY & ADDITIONAL STUDIES

## 2020-12-06 NOTE — PROGRESS NOTE ADULT - ASSESSMENT
48 yo M hx hypertension, hypothyroidism, and chronic glomerulonephritis and CKD/ESRD sent in from nephrology office for HD.

## 2020-12-06 NOTE — PROGRESS NOTE ADULT - ASSESSMENT
50y/o with hx of hypothyroidism, HTN, chronic glomerulonephritis now progress to ESRD, admitted for dialysis initiation surgery consulted for PD catheter placement    s/p IR eitanley placement 12/3    Recommendations:  - will plan for PD catheter placement this week pending OR availability   will update primary team when surgery is scheduled  likely mid-week  next HD session 12/8 noted  - medical optimization for surgery, please document medical clearance  - appreciate nephrology recommendations  patient getting permacath this week with IR  plans to start PD 1/2021    A TEAM SURGERY  [id number]

## 2020-12-06 NOTE — CONSULT NOTE ADULT - SUBJECTIVE AND OBJECTIVE BOX
HPI:  50 yo M hx hypertension, hypothyroidism, and chronic glomerulonephritis and CKD/ESRD sent in from nephrology office for HD. Right IJ non-tunnelled catheter placed by IR on 12/3.    IR consulted for conversion of right IJ non-tunnelled to tunnelled hemodialysis catheter.       Allergies :latex (Blisters)  No Known Drug Allergies      PAST MEDICAL & SURGICAL HISTORY:  History of rhinoplasty    Hypothyroid    HTN (hypertension)    No significant past surgical history          Pertinent labs:                      9.1    7.02  )-----------( 166      ( 06 Dec 2020 08:52 )             27.3   12-06    133<L>  |  97<L>  |  20  ----------------------------<  85  3.5   |  26  |  4.71<H>    Ca    8.5      06 Dec 2020 08:51  Phos  1.9     12-06  Mg     1.9     12-06        A/P:   50 yo M hx hypertension, hypothyroidism, and chronic glomerulonephritis and CKD/ESRD sent in from nephrology office for HD. Right IJ non-tunnelled catheter placed by IR on 12/3.    IR consulted for conversion of right IJ non-tunnelled to tunnelled hemodialysis catheter.   -Procedure tentatively scheduled for 12/7, but please page IR in AM after 8am to confirm.   -Place IR procedure order with Dr. Gomez  -NPO after midnight  -AM labs including coags  -Hold tx and px anticoagulation

## 2020-12-06 NOTE — PROGRESS NOTE ADULT - PROBLEM SELECTOR PLAN 1
- Monitor electrolytes and renal functions.  - S/p rt REYNALDO zee 12/3, appreciate IR   - HD plan per nephrology recs.  - Phos binder TID.  - Renal diet.  - For PD catheter placement next week --> he is medically cleared for OR  - Ordered IR consult this week for conversion of rt REYNALDO zee to tunneled HD catheter

## 2020-12-06 NOTE — PROGRESS NOTE ADULT - SUBJECTIVE AND OBJECTIVE BOX
No new overnight symptoms  FEels well  Tolerating HD so far    Vital Signs Last 24 Hrs  T(C): 36.8 (06 Dec 2020 05:24), Max: 37 (06 Dec 2020 00:15)  T(F): 98.3 (06 Dec 2020 05:24), Max: 98.6 (06 Dec 2020 00:15)  HR: 81 (06 Dec 2020 05:24) (69 - 88)  BP: 125/82 (06 Dec 2020 05:24) (119/78 - 144/97)  BP(mean): --  RR: 16 (06 Dec 2020 05:24) (16 - 18)  SpO2: 96% (06 Dec 2020 05:24) (96% - 98%)    I&O's Summary    12-05-20 @ 07:01  -  12-06-20 @ 07:00  --------------------------------------------------------  IN: 400 mL / OUT: 1350 mL / NET: -950 mL          GENERAL: NAD, well-developed  HEAD:  Atraumatic, Normocephalic  EYES: EOMI, PERRLA, conjunctiva and sclera clear  NECK: Supple, No JVD  CHEST/LUNG: Clear to auscultation bilaterally; No wheeze  HEART: Regular rate and rhythm; No murmurs, rubs, or gallops  ABDOMEN: Soft, Nontender, Nondistended; Bowel sounds present  EXTREMITIES:  2+ Peripheral Pulses, No clubbing, cyanosis, or edema  PSYCH: AAOx3  NEUROLOGY: non-focal  SKIN: No rashes or lesions; rt IJ ria zee c/d/i      LABS:                        9.1    7.02  )-----------( 166      ( 06 Dec 2020 08:52 )             27.3     12-06    133<L>  |  97<L>  |  20  ----------------------------<  85  3.5   |  26  |  4.71<H>    Ca    8.5      06 Dec 2020 08:51  Phos  1.9     12-06  Mg     1.9     12-06        CAPILLARY BLOOD GLUCOSE                RADIOLOGY & ADDITIONAL TESTS:    Imaging Personally Reviewed:  [x] YES  [ ] NO    Consultant(s) Notes Reviewed:  [x] YES  [ ] NO

## 2020-12-07 LAB
ANION GAP SERPL CALC-SCNC: 13 MMOL/L — SIGNIFICANT CHANGE UP (ref 7–14)
BUN SERPL-MCNC: 39 MG/DL — HIGH (ref 7–23)
CALCIUM SERPL-MCNC: 8.5 MG/DL — SIGNIFICANT CHANGE UP (ref 8.4–10.5)
CHLORIDE SERPL-SCNC: 98 MMOL/L — SIGNIFICANT CHANGE UP (ref 98–107)
CO2 SERPL-SCNC: 26 MMOL/L — SIGNIFICANT CHANGE UP (ref 22–31)
CREAT SERPL-MCNC: 7.27 MG/DL — HIGH (ref 0.5–1.3)
GLUCOSE SERPL-MCNC: 78 MG/DL — SIGNIFICANT CHANGE UP (ref 70–99)
HCT VFR BLD CALC: 27.8 % — LOW (ref 39–50)
HGB BLD-MCNC: 9.1 G/DL — LOW (ref 13–17)
MCHC RBC-ENTMCNC: 29 PG — SIGNIFICANT CHANGE UP (ref 27–34)
MCHC RBC-ENTMCNC: 32.7 GM/DL — SIGNIFICANT CHANGE UP (ref 32–36)
MCV RBC AUTO: 88.5 FL — SIGNIFICANT CHANGE UP (ref 80–100)
NRBC # BLD: 0 /100 WBCS — SIGNIFICANT CHANGE UP
NRBC # FLD: 0 K/UL — SIGNIFICANT CHANGE UP
PLATELET # BLD AUTO: 193 K/UL — SIGNIFICANT CHANGE UP (ref 150–400)
POTASSIUM SERPL-MCNC: 3.9 MMOL/L — SIGNIFICANT CHANGE UP (ref 3.5–5.3)
POTASSIUM SERPL-SCNC: 3.9 MMOL/L — SIGNIFICANT CHANGE UP (ref 3.5–5.3)
RBC # BLD: 3.14 M/UL — LOW (ref 4.2–5.8)
RBC # FLD: 13 % — SIGNIFICANT CHANGE UP (ref 10.3–14.5)
SODIUM SERPL-SCNC: 137 MMOL/L — SIGNIFICANT CHANGE UP (ref 135–145)
WBC # BLD: 6.66 K/UL — SIGNIFICANT CHANGE UP (ref 3.8–10.5)
WBC # FLD AUTO: 6.66 K/UL — SIGNIFICANT CHANGE UP (ref 3.8–10.5)

## 2020-12-07 PROCEDURE — 71045 X-RAY EXAM CHEST 1 VIEW: CPT | Mod: 26

## 2020-12-07 RX ORDER — MUPIROCIN 20 MG/G
1 OINTMENT TOPICAL
Refills: 0 | Status: DISCONTINUED | OUTPATIENT
Start: 2020-12-07 | End: 2020-12-11

## 2020-12-07 RX ORDER — MUPIROCIN 20 MG/G
1 OINTMENT TOPICAL
Refills: 0 | Status: DISCONTINUED | OUTPATIENT
Start: 2020-12-07 | End: 2020-12-07

## 2020-12-07 RX ORDER — ERYTHROPOIETIN 10000 [IU]/ML
10000 INJECTION, SOLUTION INTRAVENOUS; SUBCUTANEOUS ONCE
Refills: 0 | Status: COMPLETED | OUTPATIENT
Start: 2020-12-07 | End: 2020-12-07

## 2020-12-07 RX ADMIN — HEPARIN SODIUM 5000 UNIT(S): 5000 INJECTION INTRAVENOUS; SUBCUTANEOUS at 17:08

## 2020-12-07 RX ADMIN — Medication 50 MILLIGRAM(S): at 06:05

## 2020-12-07 RX ADMIN — CHLORHEXIDINE GLUCONATE 1 APPLICATION(S): 213 SOLUTION TOPICAL at 06:05

## 2020-12-07 RX ADMIN — ERYTHROPOIETIN 10000 UNIT(S): 10000 INJECTION, SOLUTION INTRAVENOUS; SUBCUTANEOUS at 22:20

## 2020-12-07 RX ADMIN — Medication 112 MICROGRAM(S): at 06:04

## 2020-12-07 RX ADMIN — Medication 2001 MILLIGRAM(S): at 12:05

## 2020-12-07 RX ADMIN — Medication 2001 MILLIGRAM(S): at 17:08

## 2020-12-07 RX ADMIN — MUPIROCIN 1 APPLICATION(S): 20 OINTMENT TOPICAL at 17:08

## 2020-12-07 RX ADMIN — HEPARIN SODIUM 5000 UNIT(S): 5000 INJECTION INTRAVENOUS; SUBCUTANEOUS at 06:05

## 2020-12-07 RX ADMIN — HEPARIN SODIUM 5000 UNIT(S): 5000 INJECTION INTRAVENOUS; SUBCUTANEOUS at 23:41

## 2020-12-07 RX ADMIN — AMLODIPINE BESYLATE 5 MILLIGRAM(S): 2.5 TABLET ORAL at 06:04

## 2020-12-07 RX ADMIN — Medication 2001 MILLIGRAM(S): at 08:44

## 2020-12-07 NOTE — DIETITIAN INITIAL EVALUATION ADULT. - ORAL INTAKE PTA/DIET HISTORY
Patient reports poor appetite and decreased PO intake x1 month in setting of feeling unwell, nausea and vomiting. Patient endorses eating 25-50% of normal intake at home (i.e. only able to finish 50% bowl of cereal, 25% dinner compared to baseline). Notes that certain foods would make him nauseas such as steak and fried foods. NKFA.

## 2020-12-07 NOTE — DIETITIAN INITIAL EVALUATION ADULT. - OTHER INFO
Per chart review patient with medical history of HTN, hypothyroidism, chronic glomerulonephritis and CKD/ESRD sent in from nephrology office for HD. Patient s/p right REYNALDO zee 12/3, started on dialysis in-house. Patient reports appetite/PO intake have improved in-house. Endorses consuming 100% meals and tolerating well. No GI distress (nausea/vomiting/diarrhea/constipation) noted at this time.     Patient reports weight loss prior to admission. Notes UBW is normally ~175# (79.5kg). Weights in-house vary,  75.1kg (12/04) -> 69.9kg (12/06, post-HD). Based on UBW and most recent weight, patient likely with 12% weight loss in 1 month. Encouraged PO intake as tolerated. Provided diet education regarding renal diet modifications (potassium and phosphorus content of foods, low sodium diet and consuming adequate protein 2/2 HD). Written materials provided.

## 2020-12-07 NOTE — DIETITIAN INITIAL EVALUATION ADULT. - SIGNS/SYMPTOMS
As evidenced by 12% weight loss in 1month, moderate muscle mass depletion As evidenced by patient with ESRD and receiving dialysis

## 2020-12-07 NOTE — CHART NOTE - TREATMENT: THE FOLLOWING DIET HAS BEEN RECOMMENDED
Diet, Renal Restrictions:   For patients receiving Renal Replacement - No Protein Restr, No Conc K, No Conc Phos, Low Sodium (12-03-20 @ 11:16) [Active]

## 2020-12-07 NOTE — PROGRESS NOTE ADULT - SUBJECTIVE AND OBJECTIVE BOX
He denies iany acute CP or SOB  He is tolerating HD and diet so far    Vital Signs Last 24 Hrs  T(C): 36.8 (07 Dec 2020 06:01), Max: 37 (06 Dec 2020 12:02)  T(F): 98.2 (07 Dec 2020 06:01), Max: 98.6 (06 Dec 2020 12:02)  HR: 61 (07 Dec 2020 06:01) (61 - 74)  BP: 117/82 (07 Dec 2020 06:01) (117/82 - 129/73)  BP(mean): --  RR: 18 (07 Dec 2020 06:01) (17 - 18)  SpO2: 98% (07 Dec 2020 06:01) (96% - 98%)    I&O's Summary      GENERAL: NAD, well-developed  HEAD:  Atraumatic, Normocephalic  EYES: EOMI, PERRLA, conjunctiva and sclera clear  NECK: Supple, No JVD  CHEST/LUNG: Clear to auscultation bilaterally; No wheeze  HEART: Regular rate and rhythm; No murmurs, rubs, or gallops  ABDOMEN: Soft, Nontender, Nondistended; Bowel sounds present  EXTREMITIES:  2+ Peripheral Pulses, No clubbing, cyanosis, or edema  PSYCH: AAOx3  NEUROLOGY: non-focal  SKIN: No rashes or lesions; rt IJ ria zee c/d/i      LABS:                        9.1    7.02  )-----------( 166      ( 06 Dec 2020 08:52 )             27.3     12-06    133<L>  |  97<L>  |  20  ----------------------------<  85  3.5   |  26  |  4.71<H>    Ca    8.5      06 Dec 2020 08:51  Phos  1.9     12-06  Mg     1.9     12-06        CAPILLARY BLOOD GLUCOSE                RADIOLOGY & ADDITIONAL TESTS:    Imaging Personally Reviewed:  [x] YES  [ ] NO    Consultant(s) Notes Reviewed:  [x] YES  [ ] NO

## 2020-12-07 NOTE — PROGRESS NOTE ADULT - SUBJECTIVE AND OBJECTIVE BOX
Overnight events noted   VITAL: T(C): , Max: 36.9 (12-06-20 @ 21:54) T(F): , Max: 98.5 (12-06-20 @ 21:54) HR: 61 (12-07-20 @ 06:01) BP: 117/82 (12-07-20 @ 06:01) BP(mean): -- RR: 18 (12-07-20 @ 06:01) SpO2: 98% (12-07-20 @ 06:01)   PHYSICAL EXAM: Constitutional: NAD, Alert HEENT: NCAT, MMM Neck: Supple, No JVD Respiratory: CTA-b/l Cardiovascular: RRR s1s2, no m/r/g Gastrointestinal: BS+, soft, NT/ND Extremities: No peripheral edema b/l Neurological: no focal deficits; strength grossly intact Back: no CVAT b/l Skin: No rashes, no nevi Access: [facility]   LABS:                      9.1   6.66  )-----------( 193      ( 07 Dec 2020 11:00 )            27.8   Na(137)/K(3.9)/Cl(98)/HCO3(26)/BUN(39)/Cr(7.27)Glu(78)/Ca(8.5)/Mg(--)/PO4(--)    12-07 @ 11:00 Na(133)/K(3.5)/Cl(97)/HCO3(26)/BUN(20)/Cr(4.71)Glu(85)/Ca(8.5)/Mg(1.9)/PO4(1.9)    12-06 @ 08:51 Na(138)/K(3.8)/Cl(100)/HCO3(24)/BUN(33)/Cr(6.26)Glu(99)/Ca(9.3)/Mg(1.8)/PO4(3.5)    12-05 @ 04:40   IMPRESSION: 49M w/ HTN, hypothyroidism, and CKD5, 12/2/20 a/w uremia  (1)Renal - newly ESRD as of this admission. S/p HD on 3 consecutive days, 12/3-12/5. Planned for next HD tomorrow  (2)Lytes - acceptable for now  (3)Anemia - renally mediated; on Retacrit with HD  (4)Surgery - planned for PD catheter placement today  (5)Vasc - needing conversion of shiley to non-tunneled cath  (6)SW -needing acceptance into [facility] for HD, with eventual conversion there to PD in 1/2021   RECOMMEND: (1)Meds as ordered (2)Next HD tomorrow (3)PD catheter placement as planned (4)Conversion of shiley to tunneled cath as planned (5)F/U with SW regarding setup of outside dialysis at [facility]       Kong Mccarthy MD [facility] Office: [phone] Cell: [phone]       No pain, no sob  VITAL: T(C): , Max: 36.9 (12-06-20 @ 21:54) T(F): , Max: 98.5 (12-06-20 @ 21:54) HR: 61 (12-07-20 @ 06:01) BP: 117/82 (12-07-20 @ 06:01) RR: 18 (12-07-20 @ 06:01) SpO2: 98% (12-07-20 @ 06:01)   PHYSICAL EXAM: Constitutional: NAD, Alert HEENT: NCAT, MMM Neck: Supple, No JVD Respiratory: CTA-b/l Cardiovascular: RRR s1s2, no m/r/g Gastrointestinal: BS+, soft, NT/ND Extremities: No peripheral edema b/l Neurological: no focal deficits; strength grossly intact Back: no CVAT b/l Skin: No rashes, no nevi Access: [facility]   LABS:                      9.1   6.66  )-----------( 193      ( 07 Dec 2020 11:00 )            27.8   Na(137)/K(3.9)/Cl(98)/HCO3(26)/BUN(39)/Cr(7.27)Glu(78)/Ca(8.5)/Mg(--)/PO4(--)    12-07 @ 11:00 Na(133)/K(3.5)/Cl(97)/HCO3(26)/BUN(20)/Cr(4.71)Glu(85)/Ca(8.5)/Mg(1.9)/PO4(1.9)    12-06 @ 08:51 Na(138)/K(3.8)/Cl(100)/HCO3(24)/BUN(33)/Cr(6.26)Glu(99)/Ca(9.3)/Mg(1.8)/PO4(3.5)    12-05 @ 04:40   IMPRESSION: 49M w/ HTN, hypothyroidism, and CKD5, 12/2/20 a/w uremia  (1)Renal - newly ESRD as of this admission. S/p HD on 3 consecutive days, 12/3-12/5.   (2)Lytes - acceptable for now  (3)Anemia - renally mediated; on Retacrit with HD  (4)Surgery - planned for PD catheter placement Thursday 12/10  (5)Vasc - needing conversion of shiley to non-tunneled cath - set for tomorrow  (6)SW -needing acceptance into [facility] for HD, with eventual conversion there to PD in 1/2021   RECOMMEND: (1)Meds as ordered (2)HD tonight, and HD Wed 12/9 (3)PD catheter placement as planned for 12/10 (4)Conversion of shiley to tunneled cath as planned for 12/8  (5)F/U with SW regarding setup of outside dialysis at [facility]       Kong Mccarthy MD [facility] Office: [phone] Cell: [phone]

## 2020-12-07 NOTE — PROGRESS NOTE ADULT - ASSESSMENT
50y/o with hx of hypothyroidism, HTN, chronic glomerulonephritis now progress to ESRD, admitted for dialysis initiation surgery consulted for PD catheter placement    s/p IR shiley placement 12/3    Recommendations:  - will plan for PD catheter placement this week pending OR availability   will update primary team when surgery is scheduled  likely mid-week (wed or thurs)  next HD session 12/8 noted  - medical optimization for surgery, appreciate documented medical clearance in note 12/6  - appreciate nephrology recommendations  patient getting permacath this week with IR  plans to start PD 1/2021    A TEAM SURGERY  [id number]

## 2020-12-07 NOTE — PROGRESS NOTE ADULT - SUBJECTIVE AND OBJECTIVE BOX
GENERAL SURGERY PROGRESS NOTE    SUBJECTIVE  chart reviewed  planned for permacath with IR today      OBJECTIVE    PHYSICAL EXAM  not examined    T(C): 36.8 (12-07-20 @ 06:01), Max: 37 (12-06-20 @ 12:02)  HR: 61 (12-07-20 @ 06:01) (61 - 74)  BP: 117/82 (12-07-20 @ 06:01) (117/82 - 129/73)  RR: 18 (12-07-20 @ 06:01) (17 - 18)  SpO2: 98% (12-07-20 @ 06:01) (96% - 98%)      MEDICATIONS  amLODIPine   Tablet 5 milliGRAM(s) Oral daily  calcium acetate 2001 milliGRAM(s) Oral three times a day with meals  chlorhexidine 4% Liquid 1 Application(s) Topical <User Schedule>  epoetin demetrio-epbx (RETACRIT) Injectable 30765 Unit(s) IV Push <User Schedule>  heparin   Injectable 5000 Unit(s) SubCutaneous every 8 hours  levothyroxine 112 MICROGram(s) Oral daily  metoprolol succinate ER 50 milliGRAM(s) Oral daily  sodium chloride 0.9% lock flush 10 milliLiter(s) IV Push every 1 hour PRN      LABS                        9.1    7.02  )-----------( 166      ( 06 Dec 2020 08:52 )             27.3     12-06    133<L>  |  97<L>  |  20  ----------------------------<  85  3.5   |  26  |  4.71<H>    Ca    8.5      06 Dec 2020 08:51  Phos  1.9     12-06  Mg     1.9     12-06            RADIOLOGY & ADDITIONAL STUDIES

## 2020-12-07 NOTE — DIETITIAN INITIAL EVALUATION ADULT. - PERTINENT MEDS FT
amLODIPine   Tablet  calcium acetate  epoetin demetrio-epbx (RETACRIT) Injectable  heparin   Injectable  levothyroxine  metoprolol succinate ER

## 2020-12-07 NOTE — DIETITIAN INITIAL EVALUATION ADULT. - CURRENT DIET ORDER MEETS ESTIMATED NUTRIENT REQUIREMENTS
EXAM:  CT ANGIOGRAM OF THE CHEST 



HISTORY:  PNEUMONIA.  



TECHNIQUE:  CTA of the chest was performed.  



FINDINGS:  There is elevation of the right hemidiaphragm.  There are small gas 
collections within the right shoulder girdle.  There is infiltrate in the right 
lung base with elevation of the right hemidiaphragm.  There are air 
bronchograms present.  The visualized upper abdominal structures are normal.  
There is mild degenerative change of the thoracic spine.  



IMPRESSION:  

NO CTA FINDINGS SUGGESTIVE OF PULMONARY EMBOLISM.  THERE ARE SMALL GAS 
COLLECTIONS IN THE RIGHT SHOULDER GIRDLE.  THE ETIOLOGY OF THESE ARE UNCERTAIN.
  THERE IS A RIGHT LOWER LOBE INFILTRATE WITH AIR BRONCHOGRAMS.  THERE IS 
ELEVATION OF THE RIGHT HEMIDIAPHRAGM.  



JOB NUMBER:  [id number]
MTDD
Statement Selected

## 2020-12-07 NOTE — PROGRESS NOTE ADULT - PROBLEM SELECTOR PLAN 1
- Monitor electrolytes and renal functions.  - S/p rt REYNALDO zee 12/3, appreciate IR   - HD plan per nephrology recs.  - Phos binder TID.  - Renal diet.  - For PD catheter placement this week --> he is medically cleared for OR  - Placed IR procedure order for conversion to Permacath for 12/8  - NPO after MN otnight

## 2020-12-07 NOTE — DIETITIAN INITIAL EVALUATION ADULT. - ETIOLOGY
related to decreased appetite, physiological causes related to metabolic alterations with renal dysfunction and dialysis dependence

## 2020-12-07 NOTE — DIETITIAN INITIAL EVALUATION ADULT. - PERTINENT LABORATORY DATA
12-07 Na 137 mmol/L Glu 78 mg/dL K+ 3.9 mmol/L Cr 7.27 mg/dL<H> BUN 39 mg/dL<H>  Hgb 9.1 g/dL<L> Hct 27.8 %<L> HgA1C n/a    Glucose, Serum: 78 mg/dL  12-06 Phos 1.9

## 2020-12-08 LAB
ANION GAP SERPL CALC-SCNC: 12 MMOL/L — SIGNIFICANT CHANGE UP (ref 7–14)
APTT BLD: 34.3 SEC — SIGNIFICANT CHANGE UP (ref 27–36.3)
BASOPHILS # BLD AUTO: 0.04 K/UL — SIGNIFICANT CHANGE UP (ref 0–0.2)
BASOPHILS NFR BLD AUTO: 0.6 % — SIGNIFICANT CHANGE UP (ref 0–2)
BUN SERPL-MCNC: 23 MG/DL — SIGNIFICANT CHANGE UP (ref 7–23)
CALCIUM SERPL-MCNC: 8.4 MG/DL — SIGNIFICANT CHANGE UP (ref 8.4–10.5)
CHLORIDE SERPL-SCNC: 98 MMOL/L — SIGNIFICANT CHANGE UP (ref 98–107)
CK MB BLD-MCNC: <2.9 % — HIGH (ref 0–2.5)
CK MB CFR SERPL CALC: <1 NG/ML — SIGNIFICANT CHANGE UP
CK SERPL-CCNC: 34 U/L — SIGNIFICANT CHANGE UP (ref 30–200)
CO2 SERPL-SCNC: 27 MMOL/L — SIGNIFICANT CHANGE UP (ref 22–31)
CREAT SERPL-MCNC: 5.03 MG/DL — HIGH (ref 0.5–1.3)
EOSINOPHIL # BLD AUTO: 0.16 K/UL — SIGNIFICANT CHANGE UP (ref 0–0.5)
EOSINOPHIL NFR BLD AUTO: 2.2 % — SIGNIFICANT CHANGE UP (ref 0–6)
GLUCOSE SERPL-MCNC: 90 MG/DL — SIGNIFICANT CHANGE UP (ref 70–99)
HCT VFR BLD CALC: 27.1 % — LOW (ref 39–50)
HGB BLD-MCNC: 9 G/DL — LOW (ref 13–17)
IANC: 4.15 K/UL — SIGNIFICANT CHANGE UP (ref 1.5–8.5)
IMM GRANULOCYTES NFR BLD AUTO: 0.4 % — SIGNIFICANT CHANGE UP (ref 0–1.5)
INR BLD: 1.14 RATIO — SIGNIFICANT CHANGE UP (ref 0.88–1.17)
LYMPHOCYTES # BLD AUTO: 1.96 K/UL — SIGNIFICANT CHANGE UP (ref 1–3.3)
LYMPHOCYTES # BLD AUTO: 27.5 % — SIGNIFICANT CHANGE UP (ref 13–44)
MCHC RBC-ENTMCNC: 29.3 PG — SIGNIFICANT CHANGE UP (ref 27–34)
MCHC RBC-ENTMCNC: 33.2 GM/DL — SIGNIFICANT CHANGE UP (ref 32–36)
MCV RBC AUTO: 88.3 FL — SIGNIFICANT CHANGE UP (ref 80–100)
MONOCYTES # BLD AUTO: 0.8 K/UL — SIGNIFICANT CHANGE UP (ref 0–0.9)
MONOCYTES NFR BLD AUTO: 11.2 % — SIGNIFICANT CHANGE UP (ref 2–14)
NEUTROPHILS # BLD AUTO: 4.15 K/UL — SIGNIFICANT CHANGE UP (ref 1.8–7.4)
NEUTROPHILS NFR BLD AUTO: 58.1 % — SIGNIFICANT CHANGE UP (ref 43–77)
NRBC # BLD: 0 /100 WBCS — SIGNIFICANT CHANGE UP
NRBC # FLD: 0 K/UL — SIGNIFICANT CHANGE UP
PLATELET # BLD AUTO: 173 K/UL — SIGNIFICANT CHANGE UP (ref 150–400)
POTASSIUM SERPL-MCNC: 3.4 MMOL/L — LOW (ref 3.5–5.3)
POTASSIUM SERPL-SCNC: 3.4 MMOL/L — LOW (ref 3.5–5.3)
PROTHROM AB SERPL-ACNC: 13 SEC — SIGNIFICANT CHANGE UP (ref 9.8–13.1)
RBC # BLD: 3.07 M/UL — LOW (ref 4.2–5.8)
RBC # FLD: 13.2 % — SIGNIFICANT CHANGE UP (ref 10.3–14.5)
SODIUM SERPL-SCNC: 137 MMOL/L — SIGNIFICANT CHANGE UP (ref 135–145)
TROPONIN T, HIGH SENSITIVITY RESULT: 28 NG/L — SIGNIFICANT CHANGE UP
WBC # BLD: 7.14 K/UL — SIGNIFICANT CHANGE UP (ref 3.8–10.5)
WBC # FLD AUTO: 7.14 K/UL — SIGNIFICANT CHANGE UP (ref 3.8–10.5)

## 2020-12-08 PROCEDURE — 77001 FLUOROGUIDE FOR VEIN DEVICE: CPT | Mod: 26,GC

## 2020-12-08 PROCEDURE — 36558 INSERT TUNNELED CV CATH: CPT

## 2020-12-08 PROCEDURE — 93010 ELECTROCARDIOGRAM REPORT: CPT

## 2020-12-08 PROCEDURE — 76937 US GUIDE VASCULAR ACCESS: CPT | Mod: 26

## 2020-12-08 RX ORDER — ACETAMINOPHEN 500 MG
650 TABLET ORAL ONCE
Refills: 0 | Status: COMPLETED | OUTPATIENT
Start: 2020-12-08 | End: 2020-12-08

## 2020-12-08 RX ORDER — POTASSIUM CHLORIDE 20 MEQ
40 PACKET (EA) ORAL EVERY 4 HOURS
Refills: 0 | Status: COMPLETED | OUTPATIENT
Start: 2020-12-08 | End: 2020-12-08

## 2020-12-08 RX ORDER — TUBERCULIN PURIFIED PROTEIN DERIVATIVE 5 [IU]/.1ML
5 INJECTION, SOLUTION INTRADERMAL ONCE
Refills: 0 | Status: DISCONTINUED | OUTPATIENT
Start: 2020-12-08 | End: 2020-12-11

## 2020-12-08 RX ADMIN — MUPIROCIN 1 APPLICATION(S): 20 OINTMENT TOPICAL at 18:26

## 2020-12-08 RX ADMIN — CHLORHEXIDINE GLUCONATE 1 APPLICATION(S): 213 SOLUTION TOPICAL at 05:59

## 2020-12-08 RX ADMIN — HEPARIN SODIUM 5000 UNIT(S): 5000 INJECTION INTRAVENOUS; SUBCUTANEOUS at 23:05

## 2020-12-08 RX ADMIN — HEPARIN SODIUM 5000 UNIT(S): 5000 INJECTION INTRAVENOUS; SUBCUTANEOUS at 18:25

## 2020-12-08 RX ADMIN — Medication 650 MILLIGRAM(S): at 23:04

## 2020-12-08 RX ADMIN — Medication 40 MILLIEQUIVALENT(S): at 18:25

## 2020-12-08 RX ADMIN — Medication 2001 MILLIGRAM(S): at 18:25

## 2020-12-08 RX ADMIN — Medication 40 MILLIEQUIVALENT(S): at 09:25

## 2020-12-08 RX ADMIN — MUPIROCIN 1 APPLICATION(S): 20 OINTMENT TOPICAL at 05:56

## 2020-12-08 RX ADMIN — Medication 650 MILLIGRAM(S): at 22:05

## 2020-12-08 NOTE — PROGRESS NOTE ADULT - SUBJECTIVE AND OBJECTIVE BOX
Overnight events noted   VITAL: T(C): , Max: 37.2 (12-08-20 @ 05:48) T(F): , Max: 99 (12-08-20 @ 05:48) HR: 77 (12-08-20 @ 05:48) BP: 119/83 (12-08-20 @ 05:48) RR: 18 (12-08-20 @ 05:48) SpO2: 97% (12-08-20 @ 05:48)   PHYSICAL EXAM: Constitutional: NAD, Alert HEENT: NCAT, MMM Neck: Supple, No JVD Respiratory: CTA-b/l Cardiovascular: RRR s1s2, no m/r/g Gastrointestinal: BS+, soft, NT/ND Extremities: No peripheral edema b/l Neurological: no focal deficits; strength grossly intact Back: no CVAT b/l Skin: No rashes, no nevi Access: [facility]   LABS:                      9.0   7.14  )-----------( 173      ( 08 Dec 2020 06:51 )            27.1   Na(137)/K(3.4)/Cl(98)/HCO3(27)/BUN(23)/Cr(5.03)Glu(90)/Ca(8.4)/Mg(--)/PO4(--)    12-08 @ 07:20 Na(137)/K(3.9)/Cl(98)/HCO3(26)/BUN(39)/Cr(7.27)Glu(78)/Ca(8.5)/Mg(--)/PO4(--)    12-07 @ 11:00 Na(133)/K(3.5)/Cl(97)/HCO3(26)/BUN(20)/Cr(4.71)Glu(85)/Ca(8.5)/Mg(1.9)/PO4(1.9)    12-06 @ 08:51   IMPRESSION: 49M w/ HTN, hypothyroidism, and CKD5, 12/2/20 a/w uremia  (1)Renal - newly ESRD as of this admission. Last dialyzed yesterday  (2)Lytes - mildly hypokalemic today; dialysis-associated. Being repleted. If his K+ level is low enough for him to require repletion, we could forgo the dietary K+ limitation.  (3)Anemia - renally mediated; on Retacrit with HD  (4)Surgery - planned for PD catheter placement Thursday 12/10  (5)Vasc - needing conversion of shiley to non-tunneled cath - set for today  (6)SW -needing acceptance into [facility] for HD, with eventual conversion there to PD in 1/2021   RECOMMEND: (1)HD catheter change today as planned with IR (2)HD Wed 12/9 (3)PD catheter placement as planned for 12/10 (4)Switch diet to DASH with low-PO4 (5)F/U with SW regarding setup of outside dialysis at [facility]       Kong Mccarthy MD [facility] Office: [phone] Cell: [phone]

## 2020-12-08 NOTE — PROGRESS NOTE ADULT - SUBJECTIVE AND OBJECTIVE BOX
GENERAL SURGERY PROGRESS NOTE    SUBJECTIVE  chart reviewed  permacath placement with IR today      OBJECTIVE    PHYSICAL EXAM  not examined    T(C): 37.2 (12-08-20 @ 05:48), Max: 37.2 (12-08-20 @ 05:48)  HR: 77 (12-08-20 @ 05:48) (54 - 96)  BP: 119/83 (12-08-20 @ 05:48) (119/83 - 140/87)  RR: 18 (12-08-20 @ 05:48) (16 - 18)  SpO2: 97% (12-08-20 @ 05:48) (97% - 98%)    12-07-20 @ 07:01  -  12-08-20 @ 07:00  --------------------------------------------------------  IN: 0 mL / OUT: 500 mL / NET: -500 mL        MEDICATIONS  amLODIPine   Tablet 5 milliGRAM(s) Oral daily  calcium acetate 2001 milliGRAM(s) Oral three times a day with meals  chlorhexidine 4% Liquid 1 Application(s) Topical <User Schedule>  epoetin demetrio-epbx (RETACRIT) Injectable 04920 Unit(s) IV Push <User Schedule>  heparin   Injectable 5000 Unit(s) SubCutaneous every 8 hours  levothyroxine 112 MICROGram(s) Oral daily  metoprolol succinate ER 50 milliGRAM(s) Oral daily  mupirocin 2% Ointment 1 Application(s) Topical two times a day  potassium chloride    Tablet ER 40 milliEquivalent(s) Oral every 4 hours  sodium chloride 0.9% lock flush 10 milliLiter(s) IV Push every 1 hour PRN      LABS                        9.0    7.14  )-----------( 173      ( 08 Dec 2020 06:51 )             27.1     12-08    137  |  98  |  23  ----------------------------<  90  3.4<L>   |  27  |  5.03<H>    Ca    8.4      08 Dec 2020 07:20      PT/INR - ( 08 Dec 2020 06:51 )   PT: 13.0 sec;   INR: 1.14 ratio         PTT - ( 08 Dec 2020 06:51 )  PTT:34.3 sec      RADIOLOGY & ADDITIONAL STUDIES

## 2020-12-08 NOTE — PROGRESS NOTE ADULT - PROBLEM SELECTOR PLAN 1
- Monitor electrolytes and renal functions.  - S/p rt REYNALDO zee 12/3, appreciate IR   - HD plan per nephrology recs.  - Phos binder TID.  - Renal diet.  - For PD catheter placement 12/10 --> he is medically cleared for OR  - NPO for conversion to Permacath for 12/8  - Acute hepatitis panel testing, CXR clear, will need PPD prior to outpt HD approval

## 2020-12-08 NOTE — CHART NOTE - NSCHARTNOTEFT_GEN_A_CORE
Medicine Subsequent Hospital Care Note- ACP  CC: chest pain/sob  HPI/Subjective: 50 yo M hx hypertension, hypothyroidism, and chronic glomerulonephritis and CKD/ESRD sent in from nephrology office for HD.  ROS:  +SOB/Chest pain/nausea.  Denies fever, chills, diaphoresis , malaise, night sweat, generalized weakness, cough, sputum production, wheezing, hemoptysis, JOSEPH, orthopnea, PND, palpitations, diaphoresis, lightheadedness, dizziness, syncope, edema. nausea, vomiting, diarrhea, constipation, abdominal pain, melena, hematochezia, dysphagia, dysuria, frequency, urgency, hematuria, nocturia.  ---------------------------------------------------------------------------------------  Vital Signs:  Vital Signs Last 24 Hrs  T(C): 37.2 (12-08-20 @ 05:48), Max: 37.2 (12-08-20 @ 05:48)  T(F): 99 (12-08-20 @ 05:48), Max: 99 (12-08-20 @ 05:48)  HR: 77 (12-08-20 @ 05:48) (54 - 96)  BP: 119/83 (12-08-20 @ 05:48) (119/83 - 140/87)  RR: 18 (12-08-20 @ 05:48) (16 - 18)  SpO2: 97% (12-08-20 @ 05:48) (97% - 98%) on (O2)    Telemetry/Alarms:  General: WN/WD NAD  Neurology: Awake, nonfocal, TSAI x 4  Eyes: Scleras clear, PERRLA/ EOMI,   Neck: Neck supple, trachea midline, No JVD,   Respiratory: CTA B/L, No wheezing, rales, rhonchi  CV: RRR, S1S2,   Abdominal: Soft, NT, ND +BS,   Extremities: No edema, + peripheral pulses  Psych: Oriented x 3, normal affect    Relevant labs, radiology and Medications reviewed                        9.1    6.66  )-----------( 193      ( 07 Dec 2020 11:00 )             27.8     12-07    137  |  98  |  39<H>  ----------------------------<  78  3.9   |  26  |  7.27<H>    Ca    8.5      07 Dec 2020 11:00  Phos  1.9     12-06  Mg     1.9     12-06        MEDICATIONS  (STANDING):  amLODIPine   Tablet 5 milliGRAM(s) Oral daily  calcium acetate 2001 milliGRAM(s) Oral three times a day with meals  chlorhexidine 4% Liquid 1 Application(s) Topical <User Schedule>  epoetin demetrio-epbx (RETACRIT) Injectable 51754 Unit(s) IV Push <User Schedule>  heparin   Injectable 5000 Unit(s) SubCutaneous every 8 hours  levothyroxine 112 MICROGram(s) Oral daily  metoprolol succinate ER 50 milliGRAM(s) Oral daily  mupirocin 2% Ointment 1 Application(s) Topical two times a day    MEDICATIONS  (PRN):  sodium chloride 0.9% lock flush 10 milliLiter(s) IV Push every 1 hour PRN Pre/post blood products, medications, blood draw, and to maintain line patency    I&O's Summary    07 Dec 2020 07:01  -  08 Dec 2020 06:15  --------------------------------------------------------  IN: 0 mL / OUT: 500 mL / NET: -500 mL      I reviewed the above lab results, tests, telemetry, and  EKG interpretation. .  Assessment  49y Male  w/ PAST MEDICAL & SURGICAL HISTORY:  History of rhinoplasty    Hypothyroid    HTN (hypertension)    No significant past surgical history    admitted with complaints of Patient is a 49y old  Male who presents with a chief complaint of Need for HD (07 Dec 2020 12:16)  .now c/o left side chest pressure, stated that it radiated down his left arm, felt pressure like someone was pressing down on his chest.  He also c/o nausea and sob. ___________________________________    PLAN  1) stat ekg - SR with PVC's   2) cardiac enzymes  Disposition: Full Code     [x ]Low complexity/risk ( Time> 15min)

## 2020-12-08 NOTE — PROGRESS NOTE ADULT - ASSESSMENT
50y/o with hx of hypothyroidism, HTN, chronic glomerulonephritis now progress to ESRD, admitted for dialysis initiation surgery consulted for PD catheter placement    s/p IR shiley placement 12/3    Recommendations:  - will plan for PD catheter placement this week on thursday with Dr. Lam Kelly  will consent patient prior to surgery  next HD session today noted  - medical optimization for surgery, appreciate documented medical clearance in note 12/6  - appreciate nephrology recommendations  patient getting permacath today with IR  plans to start PD 1/2021    A TEAM SURGERY  [id number]

## 2020-12-08 NOTE — PROGRESS NOTE ADULT - SUBJECTIVE AND OBJECTIVE BOX
No new cardiac or pulmonary symptoms overnight    Vital Signs Last 24 Hrs  T(C): 37.2 (08 Dec 2020 05:48), Max: 37.2 (08 Dec 2020 05:48)  T(F): 99 (08 Dec 2020 05:48), Max: 99 (08 Dec 2020 05:48)  HR: 77 (08 Dec 2020 05:48) (54 - 96)  BP: 119/83 (08 Dec 2020 05:48) (119/83 - 140/87)  BP(mean): --  RR: 18 (08 Dec 2020 05:48) (16 - 18)  SpO2: 97% (08 Dec 2020 05:48) (97% - 98%)    I&O's Summary    12-07-20 @ 07:01  -  12-08-20 @ 07:00  --------------------------------------------------------  IN: 0 mL / OUT: 500 mL / NET: -500 mL          GENERAL: NAD, well-developed  HEAD:  Atraumatic, Normocephalic  EYES: EOMI, PERRLA, conjunctiva and sclera clear  NECK: Supple, No JVD  CHEST/LUNG: Clear to auscultation bilaterally; No wheeze  HEART: Regular rate and rhythm; No murmurs, rubs, or gallops  ABDOMEN: Soft, Nontender, Nondistended; Bowel sounds present  EXTREMITIES:  2+ Peripheral Pulses, No clubbing, cyanosis, or edema  PSYCH: AAOx3  NEUROLOGY: non-focal  SKIN: No rashes or lesions; rt IJ ria zee c/d/i    LABS:                        9.0    7.14  )-----------( 173      ( 08 Dec 2020 06:51 )             27.1     12-08    137  |  98  |  23  ----------------------------<  90  3.4<L>   |  27  |  5.03<H>    Ca    8.4      08 Dec 2020 07:20      PT/INR - ( 08 Dec 2020 06:51 )   PT: 13.0 sec;   INR: 1.14 ratio         PTT - ( 08 Dec 2020 06:51 )  PTT:34.3 sec  CAPILLARY BLOOD GLUCOSE        CARDIAC MARKERS ( 08 Dec 2020 07:20 )  x     / x     / 34 U/L / x     / <1.0 ng/mL          RADIOLOGY & ADDITIONAL TESTS:    Imaging Personally Reviewed:  [x] YES  [ ] NO    Consultant(s) Notes Reviewed:  [x] YES  [ ] NO

## 2020-12-08 NOTE — CHART NOTE - NSCHARTNOTEFT_GEN_A_CORE
PRE-INTERVENTIONAL RADIOLOGY PROCEDURE NOTE    Patient Age: 48 y/o  Patient Gender: Male  Procedure (including site / side if known):   Diagnosis / Indication: ESRD - new HD  Interventional Radiology Attending Physician: Dr. Gomez  Ordering Attending Physician: Dr. Tony Mcclellan  Pertinent medical history: Chronic glomerulonephritis/CKD/ESRD  Pertinent labs:                         9.0    7.14  )-----------( 173      ( 08 Dec 2020 06:51 )             27.1     PT/INR - ( 08 Dec 2020 06:51 )   PT: 13.0 sec;   INR: 1.14 ratio       PTT - ( 08 Dec 2020 06:51 )  PTT:34.3 sec    Patient and Family aware? Yes      Attending / Resident / NP / PA: Dr. Tony Mcclellan/ EMIGDIO Galarza  Print Sign  Contact #: [phone] PRE-INTERVENTIONAL RADIOLOGY PROCEDURE NOTE    Patient Age: 48 y/o  Patient Gender: Male  Procedure (including site / side if known): conversion rt IJ shiley to tunneled HD catheter  Diagnosis / Indication: ESRD - new HD  Interventional Radiology Attending Physician: Dr. Gomez  Ordering Attending Physician: Dr. Tony Mcclellan  Pertinent medical history: Chronic glomerulonephritis/CKD/ESRD  Pertinent labs:                         9.0    7.14  )-----------( 173      ( 08 Dec 2020 06:51 )             27.1     PT/INR - ( 08 Dec 2020 06:51 )   PT: 13.0 sec;   INR: 1.14 ratio       PTT - ( 08 Dec 2020 06:51 )  PTT:34.3 sec    Patient and Family aware? Yes      Attending / Resident / NP / PA: Dr. Tony Mcclellan/ EMIGDIO Galarza  Print Sign  Contact #: [phone]

## 2020-12-09 ENCOUNTER — TRANSCRIPTION ENCOUNTER (OUTPATIENT)
Age: 50
End: 2020-12-09

## 2020-12-09 LAB
ANION GAP SERPL CALC-SCNC: 11 MMOL/L — SIGNIFICANT CHANGE UP (ref 7–14)
BUN SERPL-MCNC: 35 MG/DL — HIGH (ref 7–23)
CALCIUM SERPL-MCNC: 8.6 MG/DL — SIGNIFICANT CHANGE UP (ref 8.4–10.5)
CHLORIDE SERPL-SCNC: 101 MMOL/L — SIGNIFICANT CHANGE UP (ref 98–107)
CO2 SERPL-SCNC: 25 MMOL/L — SIGNIFICANT CHANGE UP (ref 22–31)
CREAT SERPL-MCNC: 7.44 MG/DL — HIGH (ref 0.5–1.3)
GLUCOSE SERPL-MCNC: 92 MG/DL — SIGNIFICANT CHANGE UP (ref 70–99)
HCT VFR BLD CALC: 29 % — LOW (ref 39–50)
HGB BLD-MCNC: 9.4 G/DL — LOW (ref 13–17)
MAGNESIUM SERPL-MCNC: 1.9 MG/DL — SIGNIFICANT CHANGE UP (ref 1.6–2.6)
MCHC RBC-ENTMCNC: 29.7 PG — SIGNIFICANT CHANGE UP (ref 27–34)
MCHC RBC-ENTMCNC: 32.4 GM/DL — SIGNIFICANT CHANGE UP (ref 32–36)
MCV RBC AUTO: 91.5 FL — SIGNIFICANT CHANGE UP (ref 80–100)
MELD SCORE WITH DIALYSIS: 20 POINTS — SIGNIFICANT CHANGE UP
NRBC # BLD: 0 /100 WBCS — SIGNIFICANT CHANGE UP
NRBC # FLD: 0 K/UL — SIGNIFICANT CHANGE UP
PHOSPHATE SERPL-MCNC: 3.2 MG/DL — SIGNIFICANT CHANGE UP (ref 2.5–4.5)
PLATELET # BLD AUTO: 186 K/UL — SIGNIFICANT CHANGE UP (ref 150–400)
POTASSIUM SERPL-MCNC: 5.1 MMOL/L — SIGNIFICANT CHANGE UP (ref 3.5–5.3)
POTASSIUM SERPL-SCNC: 5.1 MMOL/L — SIGNIFICANT CHANGE UP (ref 3.5–5.3)
RBC # BLD: 3.17 M/UL — LOW (ref 4.2–5.8)
RBC # FLD: 13.8 % — SIGNIFICANT CHANGE UP (ref 10.3–14.5)
SODIUM SERPL-SCNC: 137 MMOL/L — SIGNIFICANT CHANGE UP (ref 135–145)
WBC # BLD: 6.42 K/UL — SIGNIFICANT CHANGE UP (ref 3.8–10.5)
WBC # FLD AUTO: 6.42 K/UL — SIGNIFICANT CHANGE UP (ref 3.8–10.5)

## 2020-12-09 RX ORDER — ERYTHROPOIETIN 10000 [IU]/ML
10000 INJECTION, SOLUTION INTRAVENOUS; SUBCUTANEOUS
Refills: 0 | Status: DISCONTINUED | OUTPATIENT
Start: 2020-12-09 | End: 2020-12-11

## 2020-12-09 RX ADMIN — Medication 50 MILLIGRAM(S): at 05:33

## 2020-12-09 RX ADMIN — ERYTHROPOIETIN 10000 UNIT(S): 10000 INJECTION, SOLUTION INTRAVENOUS; SUBCUTANEOUS at 20:51

## 2020-12-09 RX ADMIN — HEPARIN SODIUM 5000 UNIT(S): 5000 INJECTION INTRAVENOUS; SUBCUTANEOUS at 14:05

## 2020-12-09 RX ADMIN — Medication 2001 MILLIGRAM(S): at 13:25

## 2020-12-09 RX ADMIN — MUPIROCIN 1 APPLICATION(S): 20 OINTMENT TOPICAL at 05:34

## 2020-12-09 RX ADMIN — CHLORHEXIDINE GLUCONATE 1 APPLICATION(S): 213 SOLUTION TOPICAL at 05:34

## 2020-12-09 RX ADMIN — HEPARIN SODIUM 5000 UNIT(S): 5000 INJECTION INTRAVENOUS; SUBCUTANEOUS at 23:11

## 2020-12-09 RX ADMIN — AMLODIPINE BESYLATE 5 MILLIGRAM(S): 2.5 TABLET ORAL at 05:35

## 2020-12-09 RX ADMIN — HEPARIN SODIUM 5000 UNIT(S): 5000 INJECTION INTRAVENOUS; SUBCUTANEOUS at 10:07

## 2020-12-09 RX ADMIN — Medication 112 MICROGRAM(S): at 05:33

## 2020-12-09 RX ADMIN — Medication 2001 MILLIGRAM(S): at 10:05

## 2020-12-09 NOTE — PROGRESS NOTE ADULT - SUBJECTIVE AND OBJECTIVE BOX
Overnight events noted   VITAL: T(C): , Max: 37 (12-08-20 @ 22:07) T(F): , Max: 98.6 (12-08-20 @ 22:07) HR: 76 (12-09-20 @ 05:31) BP: 132/84 (12-09-20 @ 05:31) RR: 18 (12-09-20 @ 05:31) SpO2: 98% (12-09-20 @ 05:31)   PHYSICAL EXAM: Constitutional: NAD, Alert HEENT: NCAT, MMM Neck: Supple, No JVD Respiratory: CTA-b/l Cardiovascular: RRR s1s2, no m/r/g Gastrointestinal: BS+, soft, NT/ND Extremities: No peripheral edema b/l Neurological: no focal deficits; strength grossly intact Back: no CVAT b/l Skin: No rashes, no nevi Access: [facility]    LABS:                      9.4   6.42  )-----------( 186      ( 09 Dec 2020 06:27 )            29.0   Na(137)/K(5.1)/Cl(101)/HCO3(25)/BUN(35)/Cr(7.44)Glu(92)/Ca(8.6)/Mg(1.9)/PO4(3.2)    12-09 @ 06:27 Na(137)/K(3.4)/Cl(98)/HCO3(27)/BUN(23)/Cr(5.03)Glu(90)/Ca(8.4)/Mg(--)/PO4(--)    12-08 @ 07:20 Na(137)/K(3.9)/Cl(98)/HCO3(26)/BUN(39)/Cr(7.27)Glu(78)/Ca(8.5)/Mg(--)/PO4(--)    12-07 @ 11:00   IMPRESSION: 49M w/ HTN, hypothyroidism, and CKD5, 12/2/20 a/w uremia  (1)Renal - newly ESRD as of this admission. Due for HD today  (2)Lytes - borderline hyperkalemic as of today - will improve with HD  (3)Anemia - renally mediated; on Retacrit with HD  (4)Surgery - planned for PD catheter placement Thursday 12/10  (5)Vasc - s/p conversion of shiley to tunneled cath yesterday  (6)SW -accepted to [facility], with first HD Friday 12/11; planned for PD training start 1/2021   RECOMMEND: (1)HD today as planned (2)PD catheter placement as planned for 12/10 (3)1st outpatient HD 12/11 as planned at [facility]    Kong Mccarthy MD [facility] Office: [phone] Cell: [phone]       No pain, no sob  Ruminating about what he did wrong/what he could have done differently in the past to avoid need for dialysis. Asking about the possibility of his native renal function returning. Counseled; advised that there was nothing that he did wrong, nor likely was there anything that could have been done differently that would have allowed him to avoid dialysis. Advised that it is highly unlikely that his native renal function will return.    VITAL: T(C): , Max: 37 (12-08-20 @ 22:07) T(F): , Max: 98.6 (12-08-20 @ 22:07) HR: 76 (12-09-20 @ 05:31) BP: 132/84 (12-09-20 @ 05:31) RR: 18 (12-09-20 @ 05:31) SpO2: 98% (12-09-20 @ 05:31)   PHYSICAL EXAM: Constitutional: NAD, Alert HEENT: NCAT, MMM Neck: Supple, No JVD Respiratory: CTA-b/l Cardiovascular: RRR s1s2, no m/r/g Gastrointestinal: BS+, soft, NT/ND Extremities: No peripheral edema b/l Neurological: no focal deficits; strength grossly intact Back: no CVAT b/l Skin: No rashes, no nevi Access: [facility]    LABS:                      9.4   6.42  )-----------( 186      ( 09 Dec 2020 06:27 )            29.0   Na(137)/K(5.1)/Cl(101)/HCO3(25)/BUN(35)/Cr(7.44)Glu(92)/Ca(8.6)/Mg(1.9)/PO4(3.2)    12-09 @ 06:27 Na(137)/K(3.4)/Cl(98)/HCO3(27)/BUN(23)/Cr(5.03)Glu(90)/Ca(8.4)/Mg(--)/PO4(--)    12-08 @ 07:20 Na(137)/K(3.9)/Cl(98)/HCO3(26)/BUN(39)/Cr(7.27)Glu(78)/Ca(8.5)/Mg(--)/PO4(--)    12-07 @ 11:00   IMPRESSION: 49M w/ HTN, hypothyroidism, and CKD5, 12/2/20 a/w uremia  (1)Renal - newly ESRD as of this admission. Due for HD today  (2)Lytes - borderline hyperkalemic as of today - will improve with HD  (3)Anemia - renally mediated; on Retacrit with HD  (4)Surgery - planned for PD catheter placement Thursday 12/10  (5)Vasc - s/p conversion of shiley to tunneled cath yesterday  (6)SW -accepted to [facility], with first HD Friday 12/11; planned for PD training start 1/2021   RECOMMEND: (1)HD today as planned (2)PD catheter placement as planned for 12/10 (3)1st outpatient HD 12/11 as planned at [facility]  Counseled as above/answered his questions to the best of my ability.   Kong Mccatrhy MD [facility] Office: [phone] Cell: [phone]

## 2020-12-09 NOTE — PROGRESS NOTE ADULT - SUBJECTIVE AND OBJECTIVE BOX
SUBJECTIVE / OVERNIGHT EVENTS:  Pt seen and examined at bedside.   No overnight event.   pain at cath site but medications helped.  now Feeling better.  no cp, no sob, no n/v/d.         --------------------------------------------------------------------------------------------  LABS:                        9.4    6.42  )-----------( 186      ( 09 Dec 2020 06:27 )             29.0     12-09    137  |  101  |  35<H>  ----------------------------<  92  5.1   |  25  |  7.44<H>    Ca    8.6      09 Dec 2020 06:27  Phos  3.2     12-09  Mg     1.9     12-09    TPro  x   /  Alb  x   /  TBili  0.2  /  DBili  x   /  AST  x   /  ALT  x   /  AlkPhos  x   12-09    PT/INR - ( 09 Dec 2020 06:27 )   PT: 11.9 sec;   INR: 1.05 ratio         PTT - ( 08 Dec 2020 06:51 )  PTT:34.3 sec  CAPILLARY BLOOD GLUCOSE        CARDIAC MARKERS ( 08 Dec 2020 07:20 )  x     / x     / 34 U/L / x     / <1.0 ng/mL          RADIOLOGY & ADDITIONAL TESTS:    Imaging Personally Reviewed:  [x] YES  [ ] NO    Consultant(s) Notes Reviewed:  [x] YES  [ ] NO    MEDICATIONS  (STANDING):  amLODIPine   Tablet 5 milliGRAM(s) Oral daily  calcium acetate 2001 milliGRAM(s) Oral three times a day with meals  chlorhexidine 4% Liquid 1 Application(s) Topical <User Schedule>  epoetin demetrio-epbx (RETACRIT) Injectable 28330 Unit(s) IV Push <User Schedule>  heparin   Injectable 5000 Unit(s) SubCutaneous every 8 hours  levothyroxine 112 MICROGram(s) Oral daily  metoprolol succinate ER 50 milliGRAM(s) Oral daily  mupirocin 2% Ointment 1 Application(s) Topical two times a day  PPD  5 Tuberculin Unit(s) Injectable 5 Unit(s) IntraDermal once    MEDICATIONS  (PRN):  sodium chloride 0.9% lock flush 10 milliLiter(s) IV Push every 1 hour PRN Pre/post blood products, medications, blood draw, and to maintain line patency      Care Discussed with Consultants/Other Providers [x] YES  [ ] NO    Vital Signs Last 24 Hrs  T(C): 36.6 (09 Dec 2020 05:31), Max: 37 (08 Dec 2020 22:07)  T(F): 97.8 (09 Dec 2020 05:31), Max: 98.6 (08 Dec 2020 22:07)  HR: 76 (09 Dec 2020 05:31) (76 - 81)  BP: 132/84 (09 Dec 2020 05:31) (130/84 - 133/87)  BP(mean): --  RR: 18 (09 Dec 2020 05:31) (18 - 18)  SpO2: 98% (09 Dec 2020 05:31) (98% - 99%)  I&O's Summary    08 Dec 2020 07:01  -  09 Dec 2020 07:00  --------------------------------------------------------  IN: 472 mL / OUT: 600 mL / NET: -128 mL    PHYSICAL EXAM:  GENERAL: NAD, well-developed, comfortable on room air  HEAD:  Atraumatic, Normocephalic  EYES: EOMI, PERRLA, conjunctiva and sclera clear  NECK: Supple, No JVD  CHEST/LUNG: Clear to auscultation bilaterally; No wheeze, right Permcath in place, no brusing/edema/erythema. dressing d/c/i  HEART: Regular rate and rhythm; No murmurs, rubs, or gallops  ABDOMEN: Soft, Nontender, Nondistended; Bowel sounds present  Neuro: AAOx3, no focal weakness, 5/5 b/l extremity strength  EXTREMITIES:  2+ Peripheral Pulses, No clubbing, cyanosis, or edema  SKIN: No rashes or lesions

## 2020-12-09 NOTE — PROGRESS NOTE ADULT - ASSESSMENT
48y/o with hx of hypothyroidism, HTN, chronic glomerulonephritis now progress to ESRD, admitted for dialysis initiation surgery consulted for PD catheter placement    s/p URMILA zee placement 12/3    Recommendations:  -please keep patient NPO at midnight tonight  patient needs TWO sets of type and screens, CBC and BMP for AM labs  COVID negative 12/2 noted, does not need to be repeated, confirmed with OR  that only one negative COVID test needed per admission as long as patient did not leave hospital  - OR tomorrow for lap PD catheter insertion with Dr. Lam Kelly  consented  - medical optimization for surgery, appreciate documented medical clearance in note 12/6  - appreciate nephrology recommendations    A TEAM SURGERY  [id number] 50y/o with hx of hypothyroidism, HTN, chronic glomerulonephritis now progress to ESRD, admitted for dialysis initiation surgery consulted for PD catheter placement    s/p URMILA zee placement 12/3    Recommendations:  -please keep patient NPO at midnight tonight  patient needs TWO sets of type and screens, CBC and BMP for AM labs as ordered  COVID negative 12/2 noted, does not need to be repeated, confirmed with OR  that only one negative COVID test needed per admission as long as patient did not leave hospital  - OR tomorrow for lap PD catheter insertion with Dr. Lam Kelly  consented  - medical optimization for surgery, appreciate documented medical clearance in note 12/6  - appreciate nephrology recommendations and clearance for OR  no hemodialysis before OR on 12/10 afternoon, patient can get dialysis today, patient can get dialyzed after OR on 12/10 if needed    A TEAM SURGERY  [id number] 48y/o with hx of hypothyroidism, HTN, chronic glomerulonephritis now progress to ESRD, admitted for dialysis initiation surgery consulted for PD catheter placement    s/p URMILA zee placement 12/3    Recommendations:  -please keep patient NPO at midnight tonight, IVF while NPO per primary team  patient needs TWO sets of type and screens, CBC and BMP for AM labs as ordered  COVID negative 12/2 noted, does not need to be repeated, confirmed with OR  that only one negative COVID test needed per admission as long as patient did not leave hospital  - OR tomorrow for lap PD catheter insertion with Dr. Lam Kelly  consented  - medical optimization for surgery, appreciate documented medical clearance in note 12/6  - appreciate nephrology recommendations and clearance for OR  no hemodialysis before OR on 12/10 afternoon, patient can get dialysis today, patient can get dialyzed after OR on 12/10 if needed    A TEAM SURGERY  [id number]

## 2020-12-09 NOTE — DISCHARGE NOTE PROVIDER - HOSPITAL COURSE
49 M PMHx HTN, hypothyroid, chronic glomerulonephritis, CKD presents from nephrologist office for HD. S/P Laila with IR. HD started 12/3   49 M PMHx HTN, hypothyroid, chronic glomerulonephritis, CKD presents from nephrologist office for HD. S/P Laila with IR. HD started 12/3  12/10 PD cathether placed, will continue with HD for now and follow up outpatient for HD and eventually start PD. follow up with surgery Dr. Rodriguez in 2 weeks. 49 M PMHx HTN, hypothyroid, chronic glomerulonephritis, CKD presents from nephrologist office for HD. S/P Laila with IR. HD started 12/3  12/10 PD cathether placed, will continue with HD for now and follow up outpatient for HD and eventually start PD. follow up with surgery Dr. Rodriguez in 2 weeks.     Attending Addendum:  Patient seen and examined by me on the discharge day. Medications reviewed.  All questions answered in details. Follow up plan explained. d/w the girlfriend at bedside with pt's permission.   More than 30 mins were spent evaluating patient and coordinating care for discharge.  Discharge summary sent to pt's primary care physician at [facility].

## 2020-12-09 NOTE — DISCHARGE NOTE PROVIDER - NSDCCPTREATMENT_GEN_ALL_CORE_FT
PRINCIPAL PROCEDURE  Procedure: Laparoscopic insertion of peritoneal dialysis catheter  Findings and Treatment: 12/10/2020

## 2020-12-09 NOTE — DISCHARGE NOTE PROVIDER - NSDCMRMEDTOKEN_GEN_ALL_CORE_FT
AMLODIPINE BESYLATE 5MG TABLETS: TK 1 T PO D  METOPROLOL ER SUCCINATE 50MG TABS: TK 1 T PO QD  SYNTHROID 0.112MG (112MCG) TABLETS: TK 1 T PO QD   amLODIPine 5 mg oral tablet: 1 tab(s) orally once a day  levothyroxine 112 mcg (0.112 mg) oral tablet: 1 tab(s) orally once a day  metoprolol succinate 50 mg oral tablet, extended release: 1 tab(s) orally once a day  mupirocin 2% topical ointment: 1 application topically 2 times a day   amLODIPine 5 mg oral tablet: 1 tab(s) orally once a day  calcium acetate 667 mg oral capsule: 3 cap(s) orally 3 times a day (with meals)   levothyroxine 112 mcg (0.112 mg) oral tablet: 1 tab(s) orally once a day  metoprolol succinate 50 mg oral tablet, extended release: 1 tab(s) orally once a day  mupirocin 2% topical ointment: 1 application topically 2 times a day   amLODIPine 5 mg oral tablet: 1 tab(s) orally once a day  calcium acetate 667 mg oral capsule: 3 cap(s) orally 3 times a day (with meals)   levothyroxine 112 mcg (0.112 mg) oral tablet: 1 tab(s) orally once a day  metoprolol succinate 50 mg oral tablet, extended release: 1 tab(s) orally once a day  mupirocin 2% topical ointment: 1 application topically 2 times a day  Tylenol 325 mg oral tablet: 2 tab(s) orally every 4 hours, As Needed    amLODIPine 5 mg oral tablet: 1 tab(s) orally once a day  calcium acetate 667 mg oral capsule: 3 cap(s) orally 3 times a day (with meals)   levothyroxine 112 mcg (0.112 mg) oral tablet: 1 tab(s) orally once a day  metoprolol succinate 50 mg oral tablet, extended release: 1 tab(s) orally once a day  mupirocin 2% topical ointment: 1 application topically 2 times a day  Percocet 5 mg-325 mg oral tablet: 1 tab(s) orally every 6 hours, As Needed MDD:4 tabs   Tylenol 325 mg oral tablet: 2 tab(s) orally every 4 hours, As Needed

## 2020-12-09 NOTE — DISCHARGE NOTE PROVIDER - CARE PROVIDER_API CALL
Carmelo Rodriguez)  Surgery  [address], [address]  [place] [address]  Phone: [phone]  Fax: [phone]  Follow Up Time:     Kong Mccarthy)  Internal Medicine; Nephrology  [address], [address]  [place] [address]  Phone: [phone]  Fax: [phone]  Follow Up Time:

## 2020-12-09 NOTE — PROGRESS NOTE ADULT - PROBLEM SELECTOR PLAN 1
- Monitor electrolytes and renal functions  - S/p rt REYNALDO zee 12/3, appreciate IR   -s/p conversion to Permacath for 12/8  - HD plan per nephrology recs.  - Phos binder TID.  - Renal diet.  - For PD catheter placement 12/10 --> he is medically cleared for OR  - Acute hepatitis panel negative, CXR clear, will need PPD prior to outpt HD approval

## 2020-12-09 NOTE — PROVIDER CONTACT NOTE (OTHER) - SITUATION
Patient is complaining of pain on right chest side (permacath insertion site) at the pain scale of 4.

## 2020-12-09 NOTE — PROGRESS NOTE ADULT - SUBJECTIVE AND OBJECTIVE BOX
GENERAL SURGERY PROGRESS NOTE    SUBJECTIVE  chart reviewed  patient consented for laparoscopic PD cath insertion yesterday        OBJECTIVE    PHYSICAL EXAM  not examined    T(C): 36.6 (12-09-20 @ 05:31), Max: 37 (12-08-20 @ 13:34)  HR: 76 (12-09-20 @ 05:31) (76 - 88)  BP: 132/84 (12-09-20 @ 05:31) (130/84 - 149/84)  RR: 18 (12-09-20 @ 05:31) (18 - 18)  SpO2: 98% (12-09-20 @ 05:31) (98% - 99%)    12-07-20 @ 07:01  -  12-08-20 @ 07:00  --------------------------------------------------------  IN: 0 mL / OUT: 500 mL / NET: -500 mL        MEDICATIONS  amLODIPine   Tablet 5 milliGRAM(s) Oral daily  calcium acetate 2001 milliGRAM(s) Oral three times a day with meals  chlorhexidine 4% Liquid 1 Application(s) Topical <User Schedule>  epoetin demetrio-epbx (RETACRIT) Injectable 20500 Unit(s) IV Push <User Schedule>  heparin   Injectable 5000 Unit(s) SubCutaneous every 8 hours  levothyroxine 112 MICROGram(s) Oral daily  metoprolol succinate ER 50 milliGRAM(s) Oral daily  mupirocin 2% Ointment 1 Application(s) Topical two times a day  PPD  5 Tuberculin Unit(s) Injectable 5 Unit(s) IntraDermal once  sodium chloride 0.9% lock flush 10 milliLiter(s) IV Push every 1 hour PRN      LABS                        9.0    7.14  )-----------( 173      ( 08 Dec 2020 06:51 )             27.1     12-08    137  |  98  |  23  ----------------------------<  90  3.4<L>   |  27  |  5.03<H>    Ca    8.4      08 Dec 2020 07:20      PT/INR - ( 08 Dec 2020 06:51 )   PT: 13.0 sec;   INR: 1.14 ratio         PTT - ( 08 Dec 2020 06:51 )  PTT:34.3 sec      RADIOLOGY & ADDITIONAL STUDIES

## 2020-12-09 NOTE — DISCHARGE NOTE PROVIDER - NSDCCPCAREPLAN_GEN_ALL_CORE_FT
PRINCIPAL DISCHARGE DIAGNOSIS  Diagnosis: ESRD (end stage renal disease)  Assessment and Plan of Treatment: Shiley placed 12/3- HD started. continue with HD as scheduled. follow up with nephrologist. 12/10 surgery Dr. Rodriguez placed PD cathether. Please follow up with Dr. Rodriguez in 2 weeks, call for appointment      SECONDARY DISCHARGE DIAGNOSES  Diagnosis: Essential hypertension  Assessment and Plan of Treatment: continue with toprol XL    Diagnosis: Hypothyroid  Assessment and Plan of Treatment: continue with synthroid

## 2020-12-09 NOTE — DISCHARGE NOTE PROVIDER - NSDCHC_MEDRECSTATUS_GEN_ALL_CORE
Pt called back to verify Dr Jacome's message again, voiced understanding.    Admission Reconciliation is Completed  Discharge Reconciliation is Not Complete Admission Reconciliation is Completed  Discharge Reconciliation is Completed

## 2020-12-10 LAB
ANION GAP SERPL CALC-SCNC: 14 MMOL/L — SIGNIFICANT CHANGE UP (ref 7–14)
BLD GP AB SCN SERPL QL: NEGATIVE — SIGNIFICANT CHANGE UP
BUN SERPL-MCNC: 31 MG/DL — HIGH (ref 7–23)
CALCIUM SERPL-MCNC: 8.2 MG/DL — LOW (ref 8.4–10.5)
CHLORIDE SERPL-SCNC: 102 MMOL/L — SIGNIFICANT CHANGE UP (ref 98–107)
CO2 SERPL-SCNC: 24 MMOL/L — SIGNIFICANT CHANGE UP (ref 22–31)
CREAT SERPL-MCNC: 6.13 MG/DL — HIGH (ref 0.5–1.3)
GLUCOSE SERPL-MCNC: 96 MG/DL — SIGNIFICANT CHANGE UP (ref 70–99)
HCT VFR BLD CALC: 29.3 % — LOW (ref 39–50)
HGB BLD-MCNC: 9.3 G/DL — LOW (ref 13–17)
MAGNESIUM SERPL-MCNC: 1.8 MG/DL — SIGNIFICANT CHANGE UP (ref 1.6–2.6)
MCHC RBC-ENTMCNC: 29.2 PG — SIGNIFICANT CHANGE UP (ref 27–34)
MCHC RBC-ENTMCNC: 31.7 GM/DL — LOW (ref 32–36)
MCV RBC AUTO: 91.8 FL — SIGNIFICANT CHANGE UP (ref 80–100)
NRBC # BLD: 0 /100 WBCS — SIGNIFICANT CHANGE UP
NRBC # FLD: 0 K/UL — SIGNIFICANT CHANGE UP
PHOSPHATE SERPL-MCNC: 3.2 MG/DL — SIGNIFICANT CHANGE UP (ref 2.5–4.5)
PLATELET # BLD AUTO: 145 K/UL — LOW (ref 150–400)
POTASSIUM SERPL-MCNC: 4.3 MMOL/L — SIGNIFICANT CHANGE UP (ref 3.5–5.3)
POTASSIUM SERPL-SCNC: 4.3 MMOL/L — SIGNIFICANT CHANGE UP (ref 3.5–5.3)
RBC # BLD: 3.19 M/UL — LOW (ref 4.2–5.8)
RBC # FLD: 14.3 % — SIGNIFICANT CHANGE UP (ref 10.3–14.5)
RH IG SCN BLD-IMP: POSITIVE — SIGNIFICANT CHANGE UP
SODIUM SERPL-SCNC: 140 MMOL/L — SIGNIFICANT CHANGE UP (ref 135–145)
WBC # BLD: 6.62 K/UL — SIGNIFICANT CHANGE UP (ref 3.8–10.5)
WBC # FLD AUTO: 6.62 K/UL — SIGNIFICANT CHANGE UP (ref 3.8–10.5)

## 2020-12-10 RX ORDER — OXYCODONE HYDROCHLORIDE 5 MG/1
10 TABLET ORAL EVERY 6 HOURS
Refills: 0 | Status: DISCONTINUED | OUTPATIENT
Start: 2020-12-10 | End: 2020-12-11

## 2020-12-10 RX ORDER — OXYCODONE HYDROCHLORIDE 5 MG/1
5 TABLET ORAL EVERY 6 HOURS
Refills: 0 | Status: DISCONTINUED | OUTPATIENT
Start: 2020-12-10 | End: 2020-12-11

## 2020-12-10 RX ORDER — FENTANYL CITRATE 50 UG/ML
50 INJECTION INTRAVENOUS
Refills: 0 | Status: DISCONTINUED | OUTPATIENT
Start: 2020-12-10 | End: 2020-12-10

## 2020-12-10 RX ORDER — HYDROMORPHONE HYDROCHLORIDE 2 MG/ML
0.5 INJECTION INTRAMUSCULAR; INTRAVENOUS; SUBCUTANEOUS
Refills: 0 | Status: DISCONTINUED | OUTPATIENT
Start: 2020-12-10 | End: 2020-12-10

## 2020-12-10 RX ADMIN — Medication 112 MICROGRAM(S): at 06:58

## 2020-12-10 RX ADMIN — Medication 2001 MILLIGRAM(S): at 19:09

## 2020-12-10 RX ADMIN — Medication 50 MILLIGRAM(S): at 06:59

## 2020-12-10 RX ADMIN — CHLORHEXIDINE GLUCONATE 1 APPLICATION(S): 213 SOLUTION TOPICAL at 06:59

## 2020-12-10 RX ADMIN — AMLODIPINE BESYLATE 5 MILLIGRAM(S): 2.5 TABLET ORAL at 06:58

## 2020-12-10 RX ADMIN — MUPIROCIN 1 APPLICATION(S): 20 OINTMENT TOPICAL at 06:59

## 2020-12-10 RX ADMIN — MUPIROCIN 1 APPLICATION(S): 20 OINTMENT TOPICAL at 19:09

## 2020-12-10 RX ADMIN — HEPARIN SODIUM 5000 UNIT(S): 5000 INJECTION INTRAVENOUS; SUBCUTANEOUS at 19:09

## 2020-12-10 RX ADMIN — HYDROMORPHONE HYDROCHLORIDE 0.5 MILLIGRAM(S): 2 INJECTION INTRAMUSCULAR; INTRAVENOUS; SUBCUTANEOUS at 16:50

## 2020-12-10 NOTE — BRIEF OPERATIVE NOTE - OPERATION/FINDINGS
optical access, diagnostic laparoscopy. insertion of peritoneal dialysis catheter with tip in pelvis. PD catheter flushed with diasylate with no issues.

## 2020-12-10 NOTE — PROGRESS NOTE ADULT - SUBJECTIVE AND OBJECTIVE BOX
GENERAL SURGERY PROGRESS NOTE    SUBJECTIVE  s/p IR permacath placement  HD net 0 yesterday  chart reviewed  NPO for OR today        OBJECTIVE    PHYSICAL EXAM  not examined    T(C): 37.1 (12-09-20 @ 22:24), Max: 37.1 (12-09-20 @ 22:24)  HR: 83 (12-09-20 @ 22:24) (76 - 83)  BP: 142/95 (12-09-20 @ 22:24) (121/85 - 152/91)  RR: 18 (12-09-20 @ 22:24) (18 - 18)  SpO2: 95% (12-09-20 @ 22:24) (95% - 98%)    12-08-20 @ 07:01  -  12-09-20 @ 07:00  --------------------------------------------------------  IN: 472 mL / OUT: 600 mL / NET: -128 mL    12-09-20 @ 07:01  -  12-10-20 @ 06:07  --------------------------------------------------------  IN: 400 mL / OUT: 1150 mL / NET: -750 mL        MEDICATIONS  amLODIPine   Tablet 5 milliGRAM(s) Oral daily  calcium acetate 2001 milliGRAM(s) Oral three times a day with meals  chlorhexidine 4% Liquid 1 Application(s) Topical <User Schedule>  epoetin demetrio-epbx (RETACRIT) Injectable 94716 Unit(s) IV Push <User Schedule>  heparin   Injectable 5000 Unit(s) SubCutaneous every 8 hours  levothyroxine 112 MICROGram(s) Oral daily  metoprolol succinate ER 50 milliGRAM(s) Oral daily  mupirocin 2% Ointment 1 Application(s) Topical two times a day  PPD  5 Tuberculin Unit(s) Injectable 5 Unit(s) IntraDermal once  sodium chloride 0.9% lock flush 10 milliLiter(s) IV Push every 1 hour PRN      LABS                        9.4    6.42  )-----------( 186      ( 09 Dec 2020 06:27 )             29.0     12-09    137  |  101  |  35<H>  ----------------------------<  92  5.1   |  25  |  7.44<H>    Ca    8.6      09 Dec 2020 06:27  Phos  3.2     12-09  Mg     1.9     12-09    TPro  x   /  Alb  x   /  TBili  0.2  /  DBili  x   /  AST  x   /  ALT  x   /  AlkPhos  x   12-09    PT/INR - ( 09 Dec 2020 06:27 )   PT: 11.9 sec;   INR: 1.05 ratio         PTT - ( 08 Dec 2020 06:51 )  PTT:34.3 sec      RADIOLOGY & ADDITIONAL STUDIES

## 2020-12-10 NOTE — PROGRESS NOTE ADULT - SUBJECTIVE AND OBJECTIVE BOX
Overnight events noted   VITAL: T(C): , Max: 37.1 (12-09-20 @ 22:24) T(F): , Max: 98.8 (12-09-20 @ 22:24) HR: 63 (12-10-20 @ 15:00) BP: 123/74 (12-10-20 @ 15:00) BP(mean): 85 (12-10-20 @ 15:00) RR: 12 (12-10-20 @ 15:00) SpO2: 100% (12-10-20 @ 15:00)   PHYSICAL EXAM: Constitutional: NAD, Alert HEENT: NCAT, MMM Neck: Supple, No JVD Respiratory: CTA-b/l Cardiovascular: RRR s1s2, no m/r/g Gastrointestinal: BS+, soft, NT/ND Extremities: No peripheral edema b/l Neurological: no focal deficits; strength grossly intact Back: no CVAT b/l Skin: No rashes, no nevi Access: [place]   LABS:                      9.3   6.62  )-----------( 145      ( 10 Dec 2020 07:17 )            29.3   Na(140)/K(4.3)/Cl(102)/HCO3(24)/BUN(31)/Cr(6.13)Glu(96)/Ca(8.2)/Mg(1.8)/PO4(3.2)    12-10 @ 07:17 Na(137)/K(5.1)/Cl(101)/HCO3(25)/BUN(35)/Cr(7.44)Glu(92)/Ca(8.6)/Mg(1.9)/PO4(3.2)    12-09 @ 06:27 Na(137)/K(3.4)/Cl(98)/HCO3(27)/BUN(23)/Cr(5.03)Glu(90)/Ca(8.4)/Mg(--)/PO4(--)    12-08 @ 07:20   IMPRESSION: 49M w/ HTN, hypothyroidism, and CKD5, 12/2/20 a/w uremia  (1)Renal - newly ESRD as of this admission. Last dialyzed yesterday  (2)Anemia - renally mediated; on Retacrit with HD  (3)Vasc - s/p conversion of shiley to tunneled cath 12/8  (4)Surg - POD#0 s/p PD catheter placement  (5)SW -accepted to [facility], with first HD Friday 12/11; planned for PD training starting 1/2021   RECOMMEND: (1)D/C planning per primary team (2)Next HD tomorrow - either at [facility] or at [facility]      Kong Mccarthy MD [facility] Office: [phone] Cell: [phone]       Seen in PACU No complaints   VITAL: T(C): , Max: 37.1 (12-09-20 @ 22:24) T(F): , Max: 98.8 (12-09-20 @ 22:24) HR: 63 (12-10-20 @ 15:00) BP: 123/74 (12-10-20 @ 15:00) BP(mean): 85 (12-10-20 @ 15:00) RR: 12 (12-10-20 @ 15:00) SpO2: 100% (12-10-20 @ 15:00)   PHYSICAL EXAM: Constitutional: lethargic but alert HEENT: NCAT, MMM Neck: Supple, No JVD Respiratory: CTA-b/l Cardiovascular: RRR s1s2, no m/r/g Gastrointestinal: (+)PD catheter Extremities: No peripheral edema b/l Neurological: no focal deficits; strength grossly intact Back: no CVAT b/l Skin: No rashes, no nevi Access: [facility]  LABS:                      9.3   6.62  )-----------( 145      ( 10 Dec 2020 07:17 )            29.3   Na(140)/K(4.3)/Cl(102)/HCO3(24)/BUN(31)/Cr(6.13)Glu(96)/Ca(8.2)/Mg(1.8)/PO4(3.2)    12-10 @ 07:17 Na(137)/K(5.1)/Cl(101)/HCO3(25)/BUN(35)/Cr(7.44)Glu(92)/Ca(8.6)/Mg(1.9)/PO4(3.2)    12-09 @ 06:27 Na(137)/K(3.4)/Cl(98)/HCO3(27)/BUN(23)/Cr(5.03)Glu(90)/Ca(8.4)/Mg(--)/PO4(--)    12-08 @ 07:20   IMPRESSION: 49M w/ HTN, hypothyroidism, and CKD5, 12/2/20 a/w uremia  (1)Renal - newly ESRD as of this admission. Last dialyzed yesterday  (2)Anemia - renally mediated; on Retacrit with HD  (3)Vasc - s/p conversion of shiley to tunneled cath 12/8  (4)Surg - POD#0 s/p PD catheter placement  (5)SW -accepted to [facility], with first HD Friday 12/11; planned for PD training starting 1/2021   RECOMMEND: (1)D/C planning per primary team (2)Next HD tomorrow - either at [facility] or at [facility]      Kong Mccarthy MD [facility] Office: [phone] Cell: [phone]

## 2020-12-10 NOTE — BRIEF OPERATIVE NOTE - NSICDXBRIEFPROCEDURE_GEN_ALL_CORE_FT
PROCEDURES:  Insertion of tunneled peritoneal dialysis catheter, with laparoscopy if indicated 10-Dec-2020 15:28:22  Mary Will

## 2020-12-10 NOTE — ASU PREOP CHECKLIST - 3.
Patient has Right perma- cath for dialysis - site is CDI - last HD dialysis 12/8/2020 as per Tere SWENSON Patient has Right Shiley- cath for dialysis - site is CDI - last HD dialysis 12/9/2020 as per Tere SWENSON

## 2020-12-10 NOTE — PROGRESS NOTE ADULT - ASSESSMENT
50y/o with hx of hypothyroidism, HTN, chronic glomerulonephritis now progress to ESRD, admitted for dialysis initiation surgery consulted for PD catheter placement    s/p URMILA zee placement 12/3    Recommendations:  -please keep patient NPO today, IVF while NPO per primary team  patient needs TWO sets of type and screens, CBC and BMP for AM labs as ordered  COVID negative 12/2 noted,  - OR today for lap PD catheter insertion with Dr. Lam Kelly  consented  - medical optimization for surgery, appreciate documented medical clearance in note 12/6  - appreciate nephrology recommendations and clearance for OR  no hemodialysis before OR today, patient can get dialyzed after OR on 12/10 if needed    A TEAM SURGERY  [id number] 50y/o with hx of hypothyroidism, HTN, chronic glomerulonephritis now progress to ESRD, admitted for dialysis initiation surgery consulted for PD catheter placement    s/p IR permacath placement    Recommendations:  -please keep patient NPO today, IVF while NPO per primary team  patient needs TWO sets of type and screens, CBC and BMP for AM labs as ordered  COVID negative 12/2 noted,  - OR today for lap PD catheter insertion with Dr. Lam Kelly  consented  - medical optimization for surgery, appreciate documented medical clearance in note 12/6  - appreciate nephrology recommendations and clearance for OR  no hemodialysis before OR today, patient can get dialyzed after OR on 12/10 if needed    A TEAM SURGERY  [id number] 48y/o with hx of hypothyroidism, HTN, chronic glomerulonephritis now progress to ESRD, admitted for dialysis initiation surgery consulted for PD catheter placement    s/p IR permacath placement    Recommendations:  -please keep patient NPO today, IVF while NPO per primary team  patient needs TWO sets of type and screens, CBC and BMP for AM labs as ordered  COVID negative 12/2 noted,  - OR today for lap PD catheter insertion with Dr. Lam Kelly  consented  - medical optimization for surgery, appreciate documented medical clearance in note 12/6  - appreciate nephrology recommendations and clearance for OR  no hemodialysis before OR today, patient can get dialyzed after OR on 12/10 if needed    A TEAM SURGERY  [id number]

## 2020-12-10 NOTE — PROGRESS NOTE ADULT - SUBJECTIVE AND OBJECTIVE BOX
SUBJECTIVE / OVERNIGHT EVENTS:  feels well. a little nervous.   No overnight event.  No complaints.  Chest pain free. no SOB, no N/V/D.  Denied HA/dizziness, abdominal pain.   await surgery today for PD cath.         --------------------------------------------------------------------------------------------  LABS:                        9.3    6.62  )-----------( 145      ( 10 Dec 2020 07:17 )             29.3     12-10    140  |  102  |  31<H>  ----------------------------<  96  4.3   |  24  |  6.13<H>    Ca    8.2<L>      10 Dec 2020 07:17  Phos  3.2     12-10  Mg     1.8     12-10    TPro  x   /  Alb  x   /  TBili  0.2  /  DBili  x   /  AST  x   /  ALT  x   /  AlkPhos  x   12-09    PT/INR - ( 09 Dec 2020 06:27 )   PT: 11.9 sec;   INR: 1.05 ratio           CAPILLARY BLOOD GLUCOSE                RADIOLOGY & ADDITIONAL TESTS:    Imaging Personally Reviewed:  [x] YES  [ ] NO    Consultant(s) Notes Reviewed:  [x] YES  [ ] NO    MEDICATIONS  (STANDING):  amLODIPine   Tablet 5 milliGRAM(s) Oral daily  calcium acetate 2001 milliGRAM(s) Oral three times a day with meals  chlorhexidine 4% Liquid 1 Application(s) Topical <User Schedule>  epoetin demetrio-epbx (RETACRIT) Injectable 70136 Unit(s) IV Push <User Schedule>  heparin   Injectable 5000 Unit(s) SubCutaneous every 8 hours  levothyroxine 112 MICROGram(s) Oral daily  metoprolol succinate ER 50 milliGRAM(s) Oral daily  mupirocin 2% Ointment 1 Application(s) Topical two times a day  PPD  5 Tuberculin Unit(s) Injectable 5 Unit(s) IntraDermal once    MEDICATIONS  (PRN):  sodium chloride 0.9% lock flush 10 milliLiter(s) IV Push every 1 hour PRN Pre/post blood products, medications, blood draw, and to maintain line patency      Care Discussed with Consultants/Other Providers [x] YES  [ ] NO    Vital Signs Last 24 Hrs  T(C): 36.8 (10 Dec 2020 12:47), Max: 37.1 (09 Dec 2020 22:24)  T(F): 98.2 (10 Dec 2020 12:47), Max: 98.8 (09 Dec 2020 22:24)  HR: 86 (10 Dec 2020 12:47) (66 - 86)  BP: 127/86 (10 Dec 2020 12:47) (116/82 - 152/91)  BP(mean): --  RR: 16 (10 Dec 2020 12:47) (16 - 18)  SpO2: 98% (10 Dec 2020 12:47) (95% - 100%)  I&O's Summary    09 Dec 2020 07:01  -  10 Dec 2020 07:00  --------------------------------------------------------  IN: 800 mL / OUT: 1600 mL / NET: -800 mL      PHYSICAL EXAM:  GENERAL: NAD, well-developed, comfortable on room air  HEAD:  Atraumatic, Normocephalic  EYES: EOMI, PERRLA, conjunctiva and sclera clear  NECK: Supple, No JVD  CHEST/LUNG: Clear to auscultation bilaterally; No wheeze, right Permcath in place, no brusing/edema/erythema. dressing d/c/i  HEART: Regular rate and rhythm; No murmurs, rubs, or gallops  ABDOMEN: Soft, Nontender, Nondistended; Bowel sounds present  Neuro: AAOx3, no focal weakness, 5/5 b/l extremity strength  EXTREMITIES:  2+ Peripheral Pulses, No clubbing, cyanosis, or edema  SKIN: No rashes or lesions

## 2020-12-10 NOTE — CHART NOTE - NSCHARTNOTESELECT_GEN_ALL_CORE
Malnutrition Notification
Event Note
Event Note
Event Note/ACP
Event Note/IR Pre Procedure Note
Event Note/Post-Op Check

## 2020-12-10 NOTE — ASU PREOP CHECKLIST - SELECT TESTS ORDERED
PT/PTT/CBC/COVID/Type and Cross/Type and Screen/INR/CMP Potassium 12/10/2020   - 4.3/CBC/COVID/CMP/Type and Cross/Type and Screen/INR/PT/PTT

## 2020-12-10 NOTE — CHART NOTE - NSCHARTNOTEFT_GEN_A_CORE
Post Operative Note  Patient: LILY FRANCO 49y (1970) Male   MRN: [id number]  Location: [place] [address] A  Visit: 12-02-20 Inpatient  Date: 12-10-20 @ 19:36    Procedure: S/P peritoneal dialysis catheter placement    Subjective:   Patient seen and examined at bedside. Had peritoneal dialysis catheter placed. Reporting pain.     Objective:  Vitals: T(F): 97.9 (12-10-20 @ 18:06), Max: 98.8 (12-09-20 @ 22:24)  HR: 65 (12-10-20 @ 18:06)  BP: 127/87 (12-10-20 @ 18:06) (113/76 - 142/95)  RR: 16 (12-10-20 @ 18:06)  SpO2: 98% (12-10-20 @ 18:06)  Vent Settings:     In:   12-09-20 @ 07:01  -  12-10-20 @ 07:00  --------------------------------------------------------  IN: 800 mL      IV Fluids: calcium acetate 2001 milliGRAM(s) Oral three times a day with meals      Out:   12-09-20 @ 07:01  -  12-10-20 @ 07:00  --------------------------------------------------------  OUT: 1600 mL      EBL:     Voided Urine:   12-09-20 @ 07:01  -  12-10-20 @ 07:00  --------------------------------------------------------  OUT: 1600 mL            Physical Examination:  General: NAD, resting comfortably in bed  HEENT: Normocephalic atraumatic  Respiratory: Nonlabored respirations, normal CW expansion.  Cardio: S1S2, regular rate and rhythm.  Abdomen: appropriately tender, surgical incisions are c/d/i  Vascular: extremities are warm and well perfused.     Imaging:  No post-op imaging studies    Assessment:  49yMale patient S/P peritoneal dialysis catheter placement for peritoneal dialysis access    Plan:  - Pain control PRN  - Diet: DASH Diet  - DVT ppx: SQH  - Dialysis tomorrow  - Plan per primary team    Date/Time: 12-10-20 @ 19:36

## 2020-12-10 NOTE — ASU PREOP CHECKLIST - MUPIRONCIN COMMENTS
non ortho //// No orders Wash done on 6 South see MAR Wash done on 6 South see MAR////// See MAR for Mupirocin ointment administration

## 2020-12-11 ENCOUNTER — TRANSCRIPTION ENCOUNTER (OUTPATIENT)
Age: 50
End: 2020-12-11

## 2020-12-11 VITALS
TEMPERATURE: 98 F | HEART RATE: 69 BPM | OXYGEN SATURATION: 100 % | RESPIRATION RATE: 17 BRPM | DIASTOLIC BLOOD PRESSURE: 90 MMHG | SYSTOLIC BLOOD PRESSURE: 140 MMHG

## 2020-12-11 LAB
ANION GAP SERPL CALC-SCNC: 11 MMOL/L — SIGNIFICANT CHANGE UP (ref 7–14)
BUN SERPL-MCNC: 37 MG/DL — HIGH (ref 7–23)
CALCIUM SERPL-MCNC: 8.3 MG/DL — LOW (ref 8.4–10.5)
CHLORIDE SERPL-SCNC: 98 MMOL/L — SIGNIFICANT CHANGE UP (ref 98–107)
CO2 SERPL-SCNC: 27 MMOL/L — SIGNIFICANT CHANGE UP (ref 22–31)
CREAT SERPL-MCNC: 7.9 MG/DL — HIGH (ref 0.5–1.3)
GLUCOSE SERPL-MCNC: 90 MG/DL — SIGNIFICANT CHANGE UP (ref 70–99)
HCT VFR BLD CALC: 27.8 % — LOW (ref 39–50)
HGB BLD-MCNC: 8.9 G/DL — LOW (ref 13–17)
MAGNESIUM SERPL-MCNC: 1.9 MG/DL — SIGNIFICANT CHANGE UP (ref 1.6–2.6)
MCHC RBC-ENTMCNC: 29.3 PG — SIGNIFICANT CHANGE UP (ref 27–34)
MCHC RBC-ENTMCNC: 32 GM/DL — SIGNIFICANT CHANGE UP (ref 32–36)
MCV RBC AUTO: 91.4 FL — SIGNIFICANT CHANGE UP (ref 80–100)
NRBC # BLD: 0 /100 WBCS — SIGNIFICANT CHANGE UP
NRBC # FLD: 0 K/UL — SIGNIFICANT CHANGE UP
PHOSPHATE SERPL-MCNC: 3.7 MG/DL — SIGNIFICANT CHANGE UP (ref 2.5–4.5)
PLATELET # BLD AUTO: 147 K/UL — LOW (ref 150–400)
POTASSIUM SERPL-MCNC: 4.1 MMOL/L — SIGNIFICANT CHANGE UP (ref 3.5–5.3)
POTASSIUM SERPL-SCNC: 4.1 MMOL/L — SIGNIFICANT CHANGE UP (ref 3.5–5.3)
RBC # BLD: 3.04 M/UL — LOW (ref 4.2–5.8)
RBC # FLD: 14.9 % — HIGH (ref 10.3–14.5)
SODIUM SERPL-SCNC: 136 MMOL/L — SIGNIFICANT CHANGE UP (ref 135–145)
WBC # BLD: 6.72 K/UL — SIGNIFICANT CHANGE UP (ref 3.8–10.5)
WBC # FLD AUTO: 6.72 K/UL — SIGNIFICANT CHANGE UP (ref 3.8–10.5)

## 2020-12-11 RX ORDER — OXYCODONE AND ACETAMINOPHEN 5; 325 MG/1; MG/1
1 TABLET ORAL
Qty: 20 | Refills: 0
Start: 2020-12-11 | End: 2020-12-15

## 2020-12-11 RX ORDER — CALCIUM ACETATE 667 MG
3 TABLET ORAL
Qty: 270 | Refills: 0
Start: 2020-12-11 | End: 2021-01-09

## 2020-12-11 RX ORDER — METOPROLOL TARTRATE 50 MG
1 TABLET ORAL
Qty: 30 | Refills: 0
Start: 2020-12-11 | End: 2021-01-09

## 2020-12-11 RX ORDER — LEVOTHYROXINE SODIUM 125 MCG
1 TABLET ORAL
Qty: 30 | Refills: 0
Start: 2020-12-11 | End: 2021-01-09

## 2020-12-11 RX ORDER — LEVOTHYROXINE SODIUM 125 MCG
0 TABLET ORAL
Qty: 0 | Refills: 1 | DISCHARGE

## 2020-12-11 RX ORDER — AMLODIPINE BESYLATE 2.5 MG/1
1 TABLET ORAL
Qty: 30 | Refills: 0
Start: 2020-12-11 | End: 2021-01-09

## 2020-12-11 RX ORDER — AMLODIPINE BESYLATE 2.5 MG/1
0 TABLET ORAL
Qty: 0 | Refills: 0 | DISCHARGE

## 2020-12-11 RX ORDER — ACETAMINOPHEN 500 MG
2 TABLET ORAL
Qty: 180 | Refills: 0
Start: 2020-12-11 | End: 2020-12-25

## 2020-12-11 RX ORDER — MUPIROCIN 20 MG/G
1 OINTMENT TOPICAL
Qty: 1 | Refills: 1
Start: 2020-12-11 | End: 2021-02-08

## 2020-12-11 RX ORDER — MUPIROCIN 20 MG/G
1 OINTMENT TOPICAL
Qty: 0 | Refills: 0 | DISCHARGE
Start: 2020-12-11

## 2020-12-11 RX ORDER — METOPROLOL TARTRATE 50 MG
0 TABLET ORAL
Qty: 0 | Refills: 5 | DISCHARGE

## 2020-12-11 RX ORDER — METOPROLOL TARTRATE 50 MG
1 TABLET ORAL
Qty: 0 | Refills: 0 | DISCHARGE
Start: 2020-12-11

## 2020-12-11 RX ORDER — AMLODIPINE BESYLATE 2.5 MG/1
1 TABLET ORAL
Qty: 0 | Refills: 0 | DISCHARGE
Start: 2020-12-11

## 2020-12-11 RX ORDER — BENZOCAINE AND MENTHOL 5; 1 G/100ML; G/100ML
1 LIQUID ORAL
Refills: 0 | Status: DISCONTINUED | OUTPATIENT
Start: 2020-12-11 | End: 2020-12-11

## 2020-12-11 RX ORDER — LEVOTHYROXINE SODIUM 125 MCG
1 TABLET ORAL
Qty: 0 | Refills: 0 | DISCHARGE
Start: 2020-12-11

## 2020-12-11 RX ADMIN — OXYCODONE HYDROCHLORIDE 10 MILLIGRAM(S): 5 TABLET ORAL at 10:55

## 2020-12-11 RX ADMIN — BENZOCAINE AND MENTHOL 1 LOZENGE: 5; 1 LIQUID ORAL at 03:16

## 2020-12-11 RX ADMIN — Medication 2001 MILLIGRAM(S): at 12:04

## 2020-12-11 RX ADMIN — ERYTHROPOIETIN 10000 UNIT(S): 10000 INJECTION, SOLUTION INTRAVENOUS; SUBCUTANEOUS at 08:54

## 2020-12-11 RX ADMIN — Medication 112 MICROGRAM(S): at 05:52

## 2020-12-11 RX ADMIN — CHLORHEXIDINE GLUCONATE 1 APPLICATION(S): 213 SOLUTION TOPICAL at 06:10

## 2020-12-11 RX ADMIN — OXYCODONE HYDROCHLORIDE 10 MILLIGRAM(S): 5 TABLET ORAL at 11:30

## 2020-12-11 NOTE — PROGRESS NOTE ADULT - NUTRITIONAL ASSESSMENT
This patient has been assessed with a concern for Malnutrition and has been determined to have a diagnosis/diagnoses of Severe protein-calorie malnutrition.    This patient is being managed with:   Diet DASH/TLC-  Sodium & Cholesterol Restricted  No Concentrated Phosphorus  Entered: Dec  8 2020  1:25PM    
This patient has been assessed with a concern for Malnutrition and has been determined to have a diagnosis/diagnoses of Severe protein-calorie malnutrition.    This patient is being managed with:   Diet DASH/TLC-  Sodium & Cholesterol Restricted  No Concentrated Phosphorus  Entered: Dec  8 2020  1:25PM    
This patient has been assessed with a concern for Malnutrition and has been determined to have a diagnosis/diagnoses of Severe protein-calorie malnutrition.    This patient is being managed with:   Diet NPO after Midnight-     NPO Start Date: 07-Dec-2020   NPO Start Time: 23:59  Entered: Dec  7 2020  9:22AM    Diet Renal Restrictions-  For patients receiving Renal Replacement - No Protein Restr No Conc K No Conc Phos Low Sodium  Entered: Dec  3 2020 11:16AM    
This patient has been assessed with a concern for Malnutrition and has been determined to have a diagnosis/diagnoses of Severe protein-calorie malnutrition.    This patient is being managed with:   Diet NPO after Midnight-     NPO Start Date: 09-Dec-2020   NPO Start Time: 23:59  Except Medications  Entered: Dec  9 2020  7:30AM    Diet DASH/TLC-  Sodium & Cholesterol Restricted  No Concentrated Phosphorus  Entered: Dec  8 2020  1:25PM    Diet NPO after Midnight-     NPO Start Date: 07-Dec-2020   NPO Start Time: 23:59  Entered: Dec  7 2020  9:22AM    
This patient has been assessed with a concern for Malnutrition and has been determined to have a diagnosis/diagnoses of Severe protein-calorie malnutrition.    This patient is being managed with:   Diet DASH/TLC-  Sodium & Cholesterol Restricted  No Concentrated Phosphorus  Entered: Dec  8 2020  1:25PM    
This patient has been assessed with a concern for Malnutrition and has been determined to have a diagnosis/diagnoses of Severe protein-calorie malnutrition.    This patient is being managed with:   Diet DASH/TLC-  Sodium & Cholesterol Restricted  No Concentrated Phosphorus  Entered: Dec  8 2020  1:25PM    Diet NPO after Midnight-     NPO Start Date: 07-Dec-2020   NPO Start Time: 23:59  Entered: Dec  7 2020  9:22AM    
This patient has been assessed with a concern for Malnutrition and has been determined to have a diagnosis/diagnoses of Severe protein-calorie malnutrition.    This patient is being managed with:   Diet NPO after Midnight-     NPO Start Date: 07-Dec-2020   NPO Start Time: 23:59  Entered: Dec  7 2020  9:22AM    Diet Renal Restrictions-  For patients receiving Renal Replacement - No Protein Restr No Conc K No Conc Phos Low Sodium  Entered: Dec  3 2020 11:16AM    
This patient has been assessed with a concern for Malnutrition and has been determined to have a diagnosis/diagnoses of Severe protein-calorie malnutrition.    This patient is being managed with:   Diet NPO after Midnight-     NPO Start Date: 09-Dec-2020   NPO Start Time: 23:59  Except Medications  Entered: Dec  9 2020  7:30AM    Diet DASH/TLC-  Sodium & Cholesterol Restricted  No Concentrated Phosphorus  Entered: Dec  8 2020  1:25PM    
This patient has been assessed with a concern for Malnutrition and has been determined to have a diagnosis/diagnoses of Severe protein-calorie malnutrition.    This patient is being managed with:   Diet NPO after Midnight-     NPO Start Date: 07-Dec-2020   NPO Start Time: 23:59  Entered: Dec  7 2020  9:22AM    Diet Renal Restrictions-  For patients receiving Renal Replacement - No Protein Restr No Conc K No Conc Phos Low Sodium  Entered: Dec  3 2020 11:16AM    
This patient has been assessed with a concern for Malnutrition and has been determined to have a diagnosis/diagnoses of Severe protein-calorie malnutrition.    This patient is being managed with:   Diet NPO after Midnight-     NPO Start Date: 09-Dec-2020   NPO Start Time: 23:59  Except Medications  Entered: Dec  9 2020  7:30AM    Diet DASH/TLC-  Sodium & Cholesterol Restricted  No Concentrated Phosphorus  Entered: Dec  8 2020  1:25PM    
This patient has been assessed with a concern for Malnutrition and has been determined to have a diagnosis/diagnoses of Severe protein-calorie malnutrition.    This patient is being managed with:   Diet NPO after Midnight-     NPO Start Date: 09-Dec-2020   NPO Start Time: 23:59  Except Medications  Entered: Dec  9 2020  7:30AM    Diet DASH/TLC-  Sodium & Cholesterol Restricted  No Concentrated Phosphorus  Entered: Dec  8 2020  1:25PM    Diet NPO after Midnight-     NPO Start Date: 07-Dec-2020   NPO Start Time: 23:59  Entered: Dec  7 2020  9:22AM

## 2020-12-11 NOTE — PROGRESS NOTE ADULT - ATTENDING COMMENTS
Patient seen and examined  NO nausea or vomiting  No abdominal pain    Awake, alert, oriented  Breathing comfortably  Abd is soft, not tender and not distended  No surgical scars    - OR planning for laparoscopic PD catheter placement Thursday by Dr. Kelly
pt seen and examined  agree with above  s/p yayo for permacath by IR 12/7  will confirm OR for PD catheter later this week ?wed/thurs  d/w pt and family at bedside
Patient seen and examined  No nausea or vomiting  No abdominal pain    Awake, alert  Breathing comfortably  Abd is soft, not tender and not distended  No rebound, no guarding    - OR planning for laparoscopic PD catheter placement Thursday by Dr. Kelly
Patient seen and examined  No nausea or vomiting  Pain controlled    Abd is soft, not distended, appropriately tender  No rebound, no guarding  PD catheter in place    - follow up as outpatient in 1-2 weeks
Patient seen and examined  Risks, benefits, and alternatives discussed  OR for laparoscopic peritoneal dialysis catheter placement tomorrow
- Dr. MARTA Lunsford ([facility])  - [phone]
- Dr. MARTA Lunsford ([facility])  - [phone]

## 2020-12-11 NOTE — PROVIDER CONTACT NOTE (OTHER) - RECOMMENDATIONS
ECG? continue to monitor pt.
contact provider
Notify the provider.
Provider to assess pt at bedside, Bladder scan patient, possible straight cath,  continue to monitor pt
contact provider

## 2020-12-11 NOTE — PROGRESS NOTE ADULT - SUBJECTIVE AND OBJECTIVE BOX
Overnight events noted   VITAL: T(C): , Max: 37.1 (12-10-20 @ 12:28) T(F): , Max: 98.8 (12-10-20 @ 12:28) HR: 74 (12-11-20 @ 07:00) BP: 145/94 (12-11-20 @ 07:00) BP(mean): 87 (12-10-20 @ 17:00) RR: 17 (12-11-20 @ 07:00) SpO2: 99% (12-11-20 @ 07:00)   PHYSICAL EXAM: Constitutional: lethargic but alert HEENT: NCAT, MMM Neck: Supple, No JVD Respiratory: CTA-b/l Cardiovascular: RRR s1s2, no m/r/g Gastrointestinal: (+)PD catheter Extremities: No peripheral edema b/l Neurological: no focal deficits; strength grossly intact Back: no CVAT b/l Skin: No rashes, no nevi Access: [facility]   LABS:                      8.9   6.72  )-----------( 147      ( 11 Dec 2020 07:52 )            27.8   Na(136)/K(4.1)/Cl(98)/HCO3(27)/BUN(37)/Cr(7.90)Glu(90)/Ca(8.3)/Mg(1.9)/PO4(3.7)    12-11 @ 07:52 Na(140)/K(4.3)/Cl(102)/HCO3(24)/BUN(31)/Cr(6.13)Glu(96)/Ca(8.2)/Mg(1.8)/PO4(3.2)    12-10 @ 07:17 Na(137)/K(5.1)/Cl(101)/HCO3(25)/BUN(35)/Cr(7.44)Glu(92)/Ca(8.6)/Mg(1.9)/PO4(3.2)    12-09 @ 06:27   IMPRESSION: 49M w/ HTN, hypothyroidism, and CKD5, 12/2/20 a/w uremia  (1)Renal - newly ESRD as of this admission. s/p Hd today  (2)Anemia - renally mediated; on Retacrit with HD  (3)Vasc - s/p conversion of shiley to tunneled cath 12/8  (4)Surg - POD#1 s/p PD catheter placement  (5)SW -accepted to [facility], with 1st HD there on Monday 12/14; planned for PD training starting 1/2021   RECOMMEND: (1)D/C planning per primary team (2)Next HD Monday 12/14 at [facility]       Kong Mccarthy MD [facility] Office: [phone] Cell: [phone]       (+)mild incisional pain. No sob, no fever, no N/V   VITAL: T(C): , Max: 37.1 (12-10-20 @ 12:28) T(F): , Max: 98.8 (12-10-20 @ 12:28) HR: 74 (12-11-20 @ 07:00) BP: 145/94 (12-11-20 @ 07:00) BP(mean): 87 (12-10-20 @ 17:00) RR: 17 (12-11-20 @ 07:00) SpO2: 99% (12-11-20 @ 07:00)   PHYSICAL EXAM: Constitutional: Alert, NAD HEENT: NCAT, MMM Neck: Supple, No JVD Respiratory: CTA-b/l Cardiovascular: RRR s1s2, no m/r/g Gastrointestinal: (+)PD catheter-incision site dressed Extremities: No peripheral edema b/l Neurological: no focal deficits; strength grossly intact Back: no CVAT b/l Skin: No rashes, no nevi Access: RIJ tunneled cath   LABS:                      8.9   6.72  )-----------( 147      ( 11 Dec 2020 07:52 )            27.8   Na(136)/K(4.1)/Cl(98)/HCO3(27)/BUN(37)/Cr(7.90)Glu(90)/Ca(8.3)/Mg(1.9)/PO4(3.7)    12-11 @ 07:52 Na(140)/K(4.3)/Cl(102)/HCO3(24)/BUN(31)/Cr(6.13)Glu(96)/Ca(8.2)/Mg(1.8)/PO4(3.2)    12-10 @ 07:17 Na(137)/K(5.1)/Cl(101)/HCO3(25)/BUN(35)/Cr(7.44)Glu(92)/Ca(8.6)/Mg(1.9)/PO4(3.2)    12-09 @ 06:27   IMPRESSION: 49M w/ HTN, hypothyroidism, and CKD5, 12/2/20 a/w uremia  (1)Renal - newly ESRD as of this admission. s/p Hd today  (2)Anemia - renally mediated; on Retacrit with HD  (3)Vasc - s/p conversion of shiley to tunneled cath 12/8  (4)Surg - POD#1 s/p PD catheter placement  (5)SW -accepted to [facility], with 1st HD there on Monday 12/14; planned for PD training starting 1/2021   RECOMMEND: (1)D/C planning per primary team (2)Next HD Monday 12/14 at [facility]       Kong Mccarthy MD [facility] Office: [phone] Cell: [phone]

## 2020-12-11 NOTE — PROGRESS NOTE ADULT - ASSESSMENT
50y/o with hx of hypothyroidism, HTN, chronic glomerulonephritis now progress to ESRD, admitted for dialysis initiation surgery consulted for PD catheter placement    s/p IR permacath placement    Recommendations:  -ok to advance diet as tolerated  - appreciate nephrology recommendations, c/w HD via permacath  no peritoneal dialysis via PD catheter for at least 1 - 2 weeks  -PD catheter dressing to be changed by dialysis nurses    Follow up with Dr. Carmelo Rodriguez in 1-2 weeks as outpatient    Please call back A TEAM SURGERY with any additional questions or concerns    Discussed with Dr. Carmelo Rodriguez    A TEAM SURGERY  [phone]

## 2020-12-11 NOTE — PROVIDER CONTACT NOTE (OTHER) - BACKGROUND
Pt PMHX of chronic glomerulonephritis and CKD/ESRD, s/p Rt IJ Laila  12/3 changed to Permacath 12/8  12/10 PD catheter placed   Dialysis scheduled for 6am 12/11/20   PD plan to start 1/2021

## 2020-12-11 NOTE — PROVIDER CONTACT NOTE (OTHER) - SITUATION
Covering RN alerted Primary RN pt is concerned that he is unable to urinate, feels fullness in bladder. Usually able to void via urinal with no issues.

## 2020-12-11 NOTE — PROGRESS NOTE ADULT - REASON FOR ADMISSION
Need for HD

## 2020-12-11 NOTE — PROVIDER CONTACT NOTE (OTHER) - ACTION/TREATMENT ORDERED:
ECG and cardiac enzyme. monitor pt.
check pt chart for EKG  administer anti nausea meds as ordered  continue to monitor pt
Bladder scan done, education given regarding straight cath, pt refusing at this time, will continue to monitor.
Provider is notified. Ordered one time dose of acetaminophen. Reassess pain in an hour. Will continue to monitor.
continue to monitor patient's BP

## 2020-12-11 NOTE — PROVIDER CONTACT NOTE (OTHER) - ASSESSMENT
pt is alert. no change in mental status. V/S stable.
pt nauseous
/103 after dialysis   manual /82  pt asymptomatic  Pt states that he was anxious during dialysis and that he should calm down soon
Patient is complaining of pain on right chest side (permacath insertion site) at the pain scale of 4.
Pt A&0x4, bladder not distended, bladder scan done: ~430cc of urine retention.   Education regarding straight cath given, pt does not want to be catheterized.  Post Repositioning pt was able to void ~100cc.   VS: as per flowsheet

## 2020-12-11 NOTE — PROGRESS NOTE ADULT - SUBJECTIVE AND OBJECTIVE BOX
GENERAL SURGERY PROGRESS NOTE    SUBJECTIVE  Patient seen and examined. No acute events overnight.   denies ab pain  in dialysis for HD via permacath this AM        OBJECTIVE    PHYSICAL EXAM  General: Appears well, NAD  CHEST: breathing comfortably  CV: appears well perfused  Abdomen: soft, nontender, nondistended, no rebound or guarding, dressings c/d/i, PD catheter in place  Extremities: Grossly symmetric    T(C): 37 (12-11-20 @ 07:00), Max: 37.1 (12-10-20 @ 12:28)  HR: 74 (12-11-20 @ 07:00) (60 - 90)  BP: 145/94 (12-11-20 @ 07:00) (113/76 - 165/95)  RR: 17 (12-11-20 @ 07:00) (12 - 18)  SpO2: 99% (12-11-20 @ 07:00) (93% - 100%)    12-10-20 @ 07:01  -  12-11-20 @ 07:00  --------------------------------------------------------  IN: 0 mL / OUT: 400 mL / NET: -400 mL        MEDICATIONS  amLODIPine   Tablet 5 milliGRAM(s) Oral daily  benzocaine 15 mG/menthol 3.6 mG (Sugar-Free) Lozenge 1 Lozenge Oral five times a day PRN  calcium acetate 2001 milliGRAM(s) Oral three times a day with meals  chlorhexidine 4% Liquid 1 Application(s) Topical <User Schedule>  epoetin demetrio-epbx (RETACRIT) Injectable 96932 Unit(s) IV Push <User Schedule>  heparin   Injectable 5000 Unit(s) SubCutaneous every 8 hours  levothyroxine 112 MICROGram(s) Oral daily  metoprolol succinate ER 50 milliGRAM(s) Oral daily  mupirocin 2% Ointment 1 Application(s) Topical two times a day  oxyCODONE    IR 5 milliGRAM(s) Oral every 6 hours PRN  oxyCODONE    IR 10 milliGRAM(s) Oral every 6 hours PRN  PPD  5 Tuberculin Unit(s) Injectable 5 Unit(s) IntraDermal once  sodium chloride 0.9% lock flush 10 milliLiter(s) IV Push every 1 hour PRN      LABS                        8.9    6.72  )-----------( 147      ( 11 Dec 2020 07:52 )             27.8     12-11    136  |  98  |  37<H>  ----------------------------<  90  4.1   |  27  |  7.90<H>    Ca    8.3<L>      11 Dec 2020 07:52  Phos  3.7     12-11  Mg     1.9     12-11            RADIOLOGY & ADDITIONAL STUDIES

## 2020-12-11 NOTE — DISCHARGE NOTE NURSING/CASE MANAGEMENT/SOCIAL WORK - NSDCCRNAME_GEN_ALL_CORE_FT
[facility] ([facility])  [address] [place] [address] Phone: [phone] Fax: [phone]. Dialysis schedule, Monday, Wednesday and Friday at 2:20pm. [facility] Appt Monday 12/14/2020 at 2:20pm.

## 2020-12-11 NOTE — DISCHARGE NOTE NURSING/CASE MANAGEMENT/SOCIAL WORK - PATIENT PORTAL LINK FT
You can access the FollowMyHealth Patient Portal offered by [facility] by registering at the following website: [email or web address]. By joining [facility]’s FollowMyHealth portal, you will also be able to view your health information using other applications (apps) compatible with our system.

## 2022-03-29 NOTE — PATIENT PROFILE ADULT - SAFE PLACE TO LIVE
Bedside and Verbal shift change report given to [address] [address] (oncoming nurse) by Jaylon Johnson (offgoing nurse). Report included the following information SBAR, Kardex and Recent Results. no

## 2022-05-19 NOTE — PATIENT PROFILE ADULT - DEAF OR HARD OF HEARING?
Patient was seen in clinic yesterday and has questions regarding stool collection. Please call at [phone],[facility]. no

## 2022-10-26 NOTE — CONSULT NOTE ADULT - I WAS PHYSICALLY PRESENT FOR THE KEY PORTIONS OF THE EVALUATION AND MANAGEMENT (E/M) SERVICE PROVIDED.  I AGREE WITH THE ABOVE HISTORY, PHYSICAL, AND PLAN WHICH I HAVE REVIEWED AND EDITED WHERE APPROPRIATE
Procedure(s):  CHOLECYSTECTOMY LAPAROSCOPIC WITH INTRAOPERATIVE WITH CHOLANGIOGRAM, LIVER BIOPSY. general    Anesthesia Post Evaluation      Multimodal analgesia: multimodal analgesia used between 6 hours prior to anesthesia start to PACU discharge  Patient location during evaluation: PACU  Patient participation: complete - patient participated  Level of consciousness: sleepy but conscious and responsive to verbal stimuli  Pain score: 2  Pain management: adequate  Airway patency: patent  Anesthetic complications: no  Cardiovascular status: acceptable  Respiratory status: acceptable  Hydration status: acceptable  Comments: +Post-Anesthesia Evaluation and Assessment    Patient: Michelle Velasquez MRN: [id number]  SSN: [id number]   YOB: 1973  Age: 44 y.o. Sex: male      Cardiovascular Function/Vital Signs    BP (!) 146/80   Pulse 89   Temp 36.5 °C (97.7 °F)   Resp 16   Ht 5' 11\" (1.803 m)   Wt 103.6 kg (228 lb 6.3 oz)   SpO2 98%   BMI 31.85 kg/m²     Patient is status post Procedure(s):  CHOLECYSTECTOMY LAPAROSCOPIC WITH INTRAOPERATIVE WITH CHOLANGIOGRAM, LIVER BIOPSY. Nausea/Vomiting: Controlled. Postoperative hydration reviewed and adequate. Pain:  Pain Scale 1: Visual (10/26/22 1200)  Pain Intensity 1: 0 (10/26/22 1200)   Managed. Neurological Status:   Neuro (WDL): Within Defined Limits (10/26/22 1036)   At baseline. Mental Status and Level of Consciousness: Arousable. Pulmonary Status:   O2 Device: Nasal cannula (10/26/22 1201)   Adequate oxygenation and airway patent. Complications related to anesthesia: None    Post-anesthesia assessment completed. No concerns.     Signed By: Alba Welch MD    10/26/2022  Post anesthesia nausea and vomiting:  controlled  Final Post Anesthesia Temperature Assessment:  Normothermia (36.0-37.5 degrees C)      INITIAL Post-op Vital signs:   Vitals Value Taken Time   /84 10/26/22 1205   Temp 36.5 °C (97.7 °F) 10/26/22 1201 Pulse 88 10/26/22 1208   Resp 17 10/26/22 1208   SpO2 98 % 10/26/22 1208   Vitals shown include unvalidated device data. Statement Selected

## 2022-12-05 NOTE — PROVIDER CONTACT NOTE (OTHER) - NAME OF MD/NP/PA/DO NOTIFIED:
"   12/05/22 5486   Appointment Info   Signing Clinician's Name / Credentials (PT) Roz Ravi, PT, DPT   Living Environment   People in Home alone   Current Living Arrangements house   Home Accessibility no concerns   Living Environment Comments Pt independent with ADLs at baseline. Daughter lives 2 miles away.   Self-Care   Usual Activity Tolerance excellent   Current Activity Tolerance good   Regular Exercise Yes   Activity/Exercise Type swimming;walking   Equipment Currently Used at Home none   Fall history within last six months no   General Information   Onset of Illness/Injury or Date of Surgery 12/03/22   Referring Physician Elliott Courtney MD   Patient/Family Therapy Goals Statement (PT) None stated.   Pertinent History of Current Problem (include personal factors and/or comorbidities that impact the POC) Per H&P: \"84 year old female with no significant PMH who is admitted on 12/3/2022 due ot Influenza A and bilateral lower lobe PNA.\"   Existing Precautions/Restrictions fall   Range of Motion (ROM)   Range of Motion ROM is WFL   Strength (Manual Muscle Testing)   Strength (Manual Muscle Testing) strength is WFL   Bed Mobility   Bed Mobility supine-sit;sit-supine   Supine-Sit [place] (Bed Mobility) independent   Sit-Supine [place] (Bed Mobility) independent   Transfers   Transfers sit-stand transfer   Sit-Stand Transfer   Sit-Stand [place] (Transfers) supervision   Gait/Stairs (Locomotion)   [place] Level (Gait) supervision;verbal cues   Distance in Feet (Required for LE Total Joints) 50'   Distance in Feet (Gait) additional 50'   Pattern (Gait) step-through   Deviations/Abnormal Patterns (Gait) tara decreased;gait speed decreased   Clinical Impression   Criteria for Skilled Therapeutic Intervention Yes, treatment indicated   PT Diagnosis (PT) impaired functional mobility   Influenced by the following impairments decreased endurance   Functional limitations due to impairments " EMIGDIO Mathur unsteady gait   Clinical Presentation (PT Evaluation Complexity) Stable/Uncomplicated   Clinical Presentation Rationale Pt presents as medically diagnosed.   Clinical Decision Making (Complexity) low complexity   Planned Therapy Interventions (PT) gait training   Risk & Benefits of therapy have been explained evaluation/treatment results reviewed;participants voiced agreement with care plan;participants included;patient;daughter   PT Total Evaluation Time   PT Eval, Low Complexity Minutes (45004) 10   Physical Therapy Goals   PT Frequency One time eval and treatment only   PT Predicted Duration/Target Date for Goal Attainment 12/05/22   PT Goals Gait   PT: Gait Supervision/stand-by assist;100 feet   Interventions   Interventions Quick Adds Gait Training;Therapeutic Activity   Therapeutic Activity   Treatment Detail/Skilled Intervention Pt ambulates to bathroom. Able to complete all cares and clothing management with assist from daughter. Educated pt on energy conservation and progression of mobility.   Gait Training   Gait Training Minutes (84681) 10   Symptoms Noted During/After Treatment (Gait Training) none   Treatment Detail/Skilled Intervention Without device. Ambulates with decreased pace, appears cautious. Daughter reports patient appears more steady than first arriving to ED. States pt's other daughter is able to stay with patient.   [place] Level (Gait Training) stand-by assist   Physical Assistance Level (Gait Training) supervision;verbal cues   PT Discharge Planning   PT Plan d/c PT   PT Discharge Recommendation (DC Rec) home with assist   PT Rationale for DC Rec Patient moving close to baseline with functional mobility. Patient has family who are able to assist as needed. No further inpatient PT needs.   PT Brief overview of current status Supine <> sit, independent. Sit <> stand without device, SBA. Ambulates total 100' without device, SBA.   Total Session Time   Timed Code Treatment Minutes 10    Total Session Time (sum of timed and untimed services) 20     Roz Ravi, PT  12/5/2022

## 2022-12-06 ENCOUNTER — EMERGENCY (EMERGENCY)
Facility: HOSPITAL | Age: 52
LOS: 1 days | Discharge: HOME | End: 2022-12-06
Attending: EMERGENCY MEDICINE | Admitting: EMERGENCY MEDICINE

## 2022-12-06 VITALS
HEART RATE: 89 BPM | SYSTOLIC BLOOD PRESSURE: 151 MMHG | OXYGEN SATURATION: 100 % | TEMPERATURE: 98 F | RESPIRATION RATE: 18 BRPM | DIASTOLIC BLOOD PRESSURE: 96 MMHG

## 2022-12-06 DIAGNOSIS — Z94.0 KIDNEY TRANSPLANT STATUS: Chronic | ICD-10-CM

## 2022-12-06 PROBLEM — Z98.890 OTHER SPECIFIED POSTPROCEDURAL STATES: Chronic | Status: ACTIVE | Noted: 2020-12-04

## 2022-12-06 LAB
ANION GAP SERPL CALC-SCNC: 12 MMOL/L — SIGNIFICANT CHANGE UP (ref 7–14)
BUN SERPL-MCNC: 17 MG/DL — SIGNIFICANT CHANGE UP (ref 7–23)
CALCIUM SERPL-MCNC: 9.1 MG/DL — SIGNIFICANT CHANGE UP (ref 8.4–10.5)
CHLORIDE SERPL-SCNC: 102 MMOL/L — SIGNIFICANT CHANGE UP (ref 98–107)
CO2 SERPL-SCNC: 25 MMOL/L — SIGNIFICANT CHANGE UP (ref 22–31)
CREAT SERPL-MCNC: 1.59 MG/DL — HIGH (ref 0.5–1.3)
EGFR: 52 ML/MIN/1.73M2 — LOW
GLUCOSE SERPL-MCNC: 109 MG/DL — HIGH (ref 70–99)
POTASSIUM SERPL-MCNC: 4.3 MMOL/L — SIGNIFICANT CHANGE UP (ref 3.5–5.3)
POTASSIUM SERPL-SCNC: 4.3 MMOL/L — SIGNIFICANT CHANGE UP (ref 3.5–5.3)
SODIUM SERPL-SCNC: 139 MMOL/L — SIGNIFICANT CHANGE UP (ref 135–145)

## 2022-12-06 PROCEDURE — 99284 EMERGENCY DEPT VISIT MOD MDM: CPT

## 2022-12-06 PROCEDURE — 93010 ELECTROCARDIOGRAM REPORT: CPT

## 2022-12-06 RX ORDER — NIFEDIPINE 30 MG
30 TABLET, EXTENDED RELEASE 24 HR ORAL ONCE
Refills: 0 | Status: COMPLETED | OUTPATIENT
Start: 2022-12-06 | End: 2022-12-06

## 2022-12-06 RX ADMIN — Medication 30 MILLIGRAM(S): at 13:55

## 2022-12-06 NOTE — ED PROVIDER NOTE - CLINICAL SUMMARY MEDICAL DECISION MAKING FREE TEXT BOX
Aurelio PGY2: 50yo M with PMH of HTN, kidney transplant (unknown cause 2021 from mother, cellcept/prograf) presents for eval of HTN w/o sxs. Doesn't take meds regularly, sees [facility] transplant team and compliant with meds. BP improved to 150s here w/o intervention. WIll check Cr to assess for worsening transplant fcn, EKG NSR. IF Cr non-actionable - dc home w/ fu.

## 2022-12-06 NOTE — ED ADULT TRIAGE NOTE - CHIEF COMPLAINT QUOTE
Patient has c/o elevated blood pressure. Last year he had a kidney transplant. Pt states he takes his pressure every day and has "a small headache but he has had worse before". Patient has c/o elevated blood pressure. Last year he had a kidney transplant. Pt states he takes his pressure every day and has "a small headache but he has had worse before".  Patient states he did not take his meds today for BP.

## 2022-12-06 NOTE — ED PROVIDER NOTE - PATIENT PORTAL LINK FT
You can access the FollowMyHealth Patient Portal offered by [facility] by registering at the following website: [email or web address]. By joining [email or web address] FollowMyHealth portal, you will also be able to view your health information using other applications (apps) compatible with our system.

## 2022-12-06 NOTE — ED PROVIDER NOTE - NSFOLLOWUPINSTRUCTIONS_ED_ALL_ED_FT
Hypertension    Hypertension, commonly called high blood pressure, is when the force of blood pumping through your arteries is too strong. Hypertension forces your heart to work harder to pump blood. Your arteries may become narrow or stiff. Having untreated or uncontrolled hypertension for a long period of time can cause heart attack, stroke, kidney disease, and other problems. If started on a medication, take exactly as prescribed by your health care professional. Maintain a healthy lifestyle and follow up with your primary care physician.    - Would recommend continuing mediations as prescribed  - Follow up with Transplant team  - Watch salt intake in diet.     SEEK IMMEDIATE MEDICAL CARE IF YOU HAVE ANY OF THE FOLLOWING SYMPTOMS: severe headache, confusion, chest pain, abdominal pain, vomiting, or shortness of breath.

## 2022-12-06 NOTE — ED PROVIDER NOTE - OBJECTIVE STATEMENT
52yo M with PMH of HTN, kidney transplant (unknown cause 2021 from mother, cellcept/prograf) presents for eval of HTN. This AM woke up and checked his BP as he habitually does with /90s. Got anxious about it, didn't take his BP med (nifedipine 30mg XL) and rechecked it - it was still high so came to ED. No headache, changes in vision or CP assoc with this BP. No changes in urination. Sees transplant team at [facility] regularly with Cr 1.47 Nov'22 in phone. Does not take nifedipine daily, was told to take it PRN when BP >130/90s. Currently no symptoms - denies CP, HA, visual changes, SOB, abd pain, NVDC.

## 2022-12-06 NOTE — ED PROVIDER NOTE - ATTENDING CONTRIBUTION TO CARE
51-year-old male with past medical history of hypertension, kidney transplant presents for high blood pressure.  Patient habitually checks blood pressure when wakes up in the morning.  Denies any change of medications, cough suppressants, diet pills recently patient has been prescribed nifedipine for blood pressure, but states takes it as needed for when blood pressure is elevated.  Awoke today with no complaints, checked blood pressure, systolic was 170, causing him to come in.  Here in ER denies any complaints, such as chest pain, headache, vomiting.  Physical exam  Gen: pt well appearing in no respiratory distress  vital signs stable; systolic   NCAT  Lungs: CTAB/L  Cardiac: s1 s2 no m/r/g  abdomen: soft/NT/ND  ext: no edema  Neuro: CNs intact 5/5 motor UE and LE; sensation intact; gait stable  skin: no rash  Impression asymptomatic hypertension  Given history of kidney transplant we will check renal function to ensure no drastic decrease.  If kidney function within normal limits patient has follow-up with renal transplant team next week, have instructed him to take blood pressures until that appointment, have instructed him to take nifedipine daily instead of as needed.

## 2022-12-06 NOTE — ED PROVIDER NOTE - PHYSICAL EXAMINATION
CONSTITUTIONAL: NAD, well appearing  SKIN: Warm dry  HEAD: NCAT  ENT: MMM  NECK: Supple; non tender.  CARD: RRR  RESP: CTAB  ABD: S/NT no R/G  EXT: no pedal edema  NEURO: Grossly unremarkable  PSYCH: Cooperative, appropriate.

## 2023-07-03 NOTE — PATIENT PROFILE ADULT - NSPROPASSIVESMOKEEXPOSURE_GEN_A_NUR
No Detail Level: None Urine Pregnancy Test Result: negative Lot # (Optional): [id number] Expiration Date (Optional): 05/31/2024 Performed By: Swathi Escobar MA

## 2024-03-13 NOTE — ED PROVIDER NOTE - EKG ADDITIONAL QUESTION - PERFORMED INDEPENDENT VISUALIZATION
influenza, injectable, quadrivalent, preservative free; 22-Sep-2020 12:56; Germaine Velazquez (RN); [employer]; [id number] (Exp. Date: 30-Jun-2021); IntraMuscular; Deltoid Right.; 0.5 milliLiter(s); VIS (VIS Published: 15-Aug-2019, VIS Presented: 22-Sep-2020);   Tdap; 29-Apr-2021 07:27; Yessi Pérez (ANNA); [employer]; [id number] (Exp. Date: 18-Nov-2022); IntraMuscular; Deltoid Right.; 0.5 milliLiter(s); VIS (VIS Published: 09-May-2013, VIS Presented: 29-Apr-2021);   
Yes

## 2024-05-13 ENCOUNTER — EMERGENCY (EMERGENCY)
Facility: HOSPITAL | Age: 54
LOS: 1 days | Discharge: HOME | End: 2024-05-13
Attending: STUDENT IN AN ORGANIZED HEALTH CARE EDUCATION/TRAINING PROGRAM | Admitting: STUDENT IN AN ORGANIZED HEALTH CARE EDUCATION/TRAINING PROGRAM
Payer: COMMERCIAL

## 2024-05-13 VITALS
OXYGEN SATURATION: 99 % | RESPIRATION RATE: 18 BRPM | TEMPERATURE: 98 F | DIASTOLIC BLOOD PRESSURE: 95 MMHG | SYSTOLIC BLOOD PRESSURE: 147 MMHG | HEART RATE: 104 BPM

## 2024-05-13 DIAGNOSIS — Z94.0 KIDNEY TRANSPLANT STATUS: Chronic | ICD-10-CM

## 2024-05-13 LAB
APPEARANCE UR: CLEAR — SIGNIFICANT CHANGE UP
BASOPHILS # BLD AUTO: 0.03 K/UL — SIGNIFICANT CHANGE UP (ref 0–0.2)
BASOPHILS NFR BLD AUTO: 0.3 % — SIGNIFICANT CHANGE UP (ref 0–2)
BILIRUB UR-MCNC: NEGATIVE — SIGNIFICANT CHANGE UP
COLOR SPEC: YELLOW — SIGNIFICANT CHANGE UP
DIFF PNL FLD: ABNORMAL
EOSINOPHIL # BLD AUTO: 0.01 K/UL — SIGNIFICANT CHANGE UP (ref 0–0.5)
EOSINOPHIL NFR BLD AUTO: 0.1 % — SIGNIFICANT CHANGE UP (ref 0–6)
GLUCOSE UR QL: NEGATIVE MG/DL — SIGNIFICANT CHANGE UP
HCT VFR BLD CALC: 40.6 % — SIGNIFICANT CHANGE UP (ref 39–50)
HGB BLD-MCNC: 13.5 G/DL — SIGNIFICANT CHANGE UP (ref 13–17)
IANC: 9.34 K/UL — HIGH (ref 1.8–7.4)
IMM GRANULOCYTES NFR BLD AUTO: 0.4 % — SIGNIFICANT CHANGE UP (ref 0–0.9)
KETONES UR-MCNC: NEGATIVE MG/DL — SIGNIFICANT CHANGE UP
LEUKOCYTE ESTERASE UR-ACNC: NEGATIVE — SIGNIFICANT CHANGE UP
LYMPHOCYTES # BLD AUTO: 0.57 K/UL — LOW (ref 1–3.3)
LYMPHOCYTES # BLD AUTO: 5.3 % — LOW (ref 13–44)
MCHC RBC-ENTMCNC: 29.9 PG — SIGNIFICANT CHANGE UP (ref 27–34)
MCHC RBC-ENTMCNC: 33.3 GM/DL — SIGNIFICANT CHANGE UP (ref 32–36)
MCV RBC AUTO: 90 FL — SIGNIFICANT CHANGE UP (ref 80–100)
MONOCYTES # BLD AUTO: 0.71 K/UL — SIGNIFICANT CHANGE UP (ref 0–0.9)
MONOCYTES NFR BLD AUTO: 6.6 % — SIGNIFICANT CHANGE UP (ref 2–14)
NEUTROPHILS # BLD AUTO: 9.34 K/UL — HIGH (ref 1.8–7.4)
NEUTROPHILS NFR BLD AUTO: 87.3 % — HIGH (ref 43–77)
NITRITE UR-MCNC: NEGATIVE — SIGNIFICANT CHANGE UP
NRBC # BLD: 0 /100 WBCS — SIGNIFICANT CHANGE UP (ref 0–0)
NRBC # FLD: 0 K/UL — SIGNIFICANT CHANGE UP (ref 0–0)
PH UR: 6 — SIGNIFICANT CHANGE UP (ref 5–8)
PLATELET # BLD AUTO: 254 K/UL — SIGNIFICANT CHANGE UP (ref 150–400)
PROT UR-MCNC: 100 MG/DL
RBC # BLD: 4.51 M/UL — SIGNIFICANT CHANGE UP (ref 4.2–5.8)
RBC # FLD: 13.3 % — SIGNIFICANT CHANGE UP (ref 10.3–14.5)
SP GR SPEC: 1.01 — SIGNIFICANT CHANGE UP (ref 1–1.03)
UROBILINOGEN FLD QL: 0.2 MG/DL — SIGNIFICANT CHANGE UP (ref 0.2–1)
WBC # BLD: 10.7 K/UL — HIGH (ref 3.8–10.5)
WBC # FLD AUTO: 10.7 K/UL — HIGH (ref 3.8–10.5)

## 2024-05-13 PROCEDURE — 93010 ELECTROCARDIOGRAM REPORT: CPT

## 2024-05-13 PROCEDURE — 99285 EMERGENCY DEPT VISIT HI MDM: CPT

## 2024-05-13 RX ORDER — SODIUM CHLORIDE 9 MG/ML
1000 INJECTION INTRAMUSCULAR; INTRAVENOUS; SUBCUTANEOUS ONCE
Refills: 0 | Status: COMPLETED | OUTPATIENT
Start: 2024-05-13 | End: 2024-05-13

## 2024-05-13 RX ADMIN — SODIUM CHLORIDE 1000 MILLILITER(S): 9 INJECTION INTRAMUSCULAR; INTRAVENOUS; SUBCUTANEOUS at 23:42

## 2024-05-13 NOTE — ED PROVIDER NOTE - ATTENDING CONTRIBUTION TO CARE
53M h/o HTN, ESRD s/p renal transplant (2020) p/w near-syncopal episode. Pt states he had been doing yard work throughout the day, went to pull start the  and after "20-30 pulls" he experiences a sudden sharp pain in his right shoulder that then spread to his left shoulder. Soon after he began to feel lightheaded, prompting him to go inside to sit down where his son was. He then was noted to turn his head toward the right and "stare off," witness by his partner on FaceTime and his son at his side. Remembers hearing his son say "dad" repeatedly. Episode lasted ~2min than back to baseline but urge to defecate and had loose BM. Denies any preceding chest pain, palpitations, vision changes, weakness/loss of sensation or other prodromal symptoms. Partner did not witness any shaking or tongue biting. No urinary incontinence or post-ictal period after. Denies recent HA, fever/chills, cardiac symptoms, abd pain, n/v/d, sick contacts or travels. Continues to have shoulder pain, otherwise at baseline.   Gen: awake and alert, oriented x3, non-toxic appearing  HEENT: PERRL, EOMI  CV: rrr, no appreciable murmur  Pulm: clear lungs  Abd: soft, ntnd  Ext: no edema/swelling  Neuro: CN 2-12 grossly intact, sensation grossly intact, motor 5/5 all extremities, weight bearing and ambulatory without imbalance   MDM: 53M h/o HTN, ESRD s/p renal transplant (2020) p/w syncopal episode - symptoms began with acute painful stimulus. No reported LOC and symptoms not c/w seizure-like activity. No prodromal symptoms, Anticipated likely vasovagal episode brought on by pain, though will monitor on tele while in ED to assess for arryhtmia, check EKG, check electrolytes and renal function. Low suspicion for acute cardiac event. Dispo pending results of workup but anticipate likely dc with outpatient follow-up

## 2024-05-13 NOTE — ED PROVIDER NOTE - OBJECTIVE STATEMENT
PGY1/DO Pan: PGY1/Pan, DO: 52 yo M with PMHx HTN, renal transplant (2020, unknown cause, cellcept/prograf) p/w syncopal episode occurred ~7pm today. Pt sts he has been working in his yard lately and hurt his shoulder pulling the lawnmower, he sts he felt sharp shoulder pain and began to feel lightheaded, prompting him to go inside and sit down, subsequent LOC for around 2 minutes, witnessed by partner at bedside. Pt denies any recollection of LOC however sts he felt fine afterwards other than having episode of loose stool after. He denies any preceding dizziness, chest pain, SOB, extremity swelling, rashes, URI sx, urinary/bowel symptoms, fevers, chills, or recent medication changes. Denies any other complaints at this time.

## 2024-05-13 NOTE — ED ADULT TRIAGE NOTE - CHIEF COMPLAINT QUOTE
Pt s/p syncopal episode at approx 7pm. States was mowing lawn and began feeling nauseous and lightheaded. Endorses arm discomfort. Denies head strike, blood thinner use, chest pain, palpitations. Hx kidney transplant, HTN. Well appearing Pt s/p syncopal episode at approx 7pm. States was mowing lawn and began feeling nauseous and lightheaded. Denies head strike, blood thinner use, chest pain, palpitations. Hx kidney transplant, HTN. Well appearing Pt s/p syncopal episode at approx 7pm. States went to start lawnmower and began feeling nauseous and lightheaded. Endorses arm injury from "starting lawnmower". Denies head strike, blood thinner use, chest pain, palpitations. Hx kidney transplant, HTN. Well appearing

## 2024-05-13 NOTE — ED PROVIDER NOTE - NSFOLLOWUPINSTRUCTIONS_ED_ALL_ED_FT
You were seen in the emergency department today for an episode of passing out.  Your workup today is unremarkable for any emergent causes that would warrant further workup and management here in the emergency department.    Please follow-up with your primary care provider in the next 3 to 5 days.     Your labs indicate that there is a small amount of blood and protein in your urine.  Your kidney function looks within normal limits for your baseline, when compared to your previous lab results.  I do highly recommend that you follow-up with your kidney doctor in the next week to repeat this testing and confirm that this is normal for you.    Please return to the emergency department immediately if you experience passing out, chest pain, shortness of breath, lightheadedness, dizziness, severe pain or any other concerning symptoms.      Thank you for choosing us for your care today.

## 2024-05-13 NOTE — ED PROVIDER NOTE - PROGRESS NOTE DETAILS
PGY1/Pan, DO: Pt feeling fine, given hx, labs, likely vasovagal syncope in context of lots of outdoor work and hurt shoulder. Informed pt of protein/blood in urine in setting of kidney function WNL for his baseline. I have discussed all results, discharge instructions, strict return precautions and need for follow up with PCP/renal/transplant with patient. They have verbalized understanding and agreement to plan at this time. Amendable to discharge.

## 2024-05-13 NOTE — ED PROVIDER NOTE - CLINICAL SUMMARY MEDICAL DECISION MAKING FREE TEXT BOX
PGY1/Pan, DO: 52 yo M with PMHx HTN, renal transplant (2020, unknown cause, cellcept/prograf) p/w ~2 min syncopal episode after working in the yard earlier this evening, sts he hurt his shoulder turning the lawnmower on and had sharp pain prior to feeling lightheaded. Denies any current symptoms. Pt returned to baseline after episode. Denies any associated injuries or sources of pain. VSS. Exam showing pt in NAD, phonation WNL, nontoxic appearing, neuro CN II-XII intact, EOMI, PERRLA, ambulatory during encounter. H&L RRR and CTAB, abd soft/nontender w/o peritoneal signs. DDx vasovagal syncope vs. less suspicion for cardiac etiology. EKG No ST elevations, depressions or t-wave inversions. Given hx, plan for labs, trop, IVF, declining analgesia. Dispo pending results.

## 2024-05-13 NOTE — ED PROVIDER NOTE - PATIENT PORTAL LINK FT
You can access the FollowMyHealth Patient Portal offered by [facility] by registering at the following website: [email or web address]. By joining [email or web address]’s FollowMyHealth portal, you will also be able to view your health information using other applications (apps) compatible with our system.

## 2024-05-13 NOTE — ED PROVIDER NOTE - PHYSICAL EXAMINATION
General: NAD. Nontoxic, well appearing. Speaking in full sentences, phonation appropriate.  Head: NC/AT.   Eyes: EOMI. Conjunctiva clear  Neck: Supple. FROM.  Cardiac: Normal S1 and S2 w/ RRR. No MGR. No JVD appreciated.  Pulmonary: CTAB. No increased WOB. No wheezes or crackles.  Abdominal: Soft, nontender, no peritoneal signs.  Neurologic: No gross focal sensory or motor deficits. Moves all 4 extremities during encounter. CN II-XII. Ambulatory during encounter.  Musculoskeletal: Strength appropriate in all 4 extremities for age with no limited ROM. No visible deformities or extremity swelling.  Skin: Color appropriate for race. Intact, warm, and well-perfused. No visible lesions or bruising.

## 2024-05-14 VITALS
TEMPERATURE: 99 F | RESPIRATION RATE: 16 BRPM | OXYGEN SATURATION: 100 % | DIASTOLIC BLOOD PRESSURE: 87 MMHG | SYSTOLIC BLOOD PRESSURE: 136 MMHG | HEART RATE: 86 BPM

## 2024-05-14 LAB
ALBUMIN SERPL ELPH-MCNC: 4.3 G/DL — SIGNIFICANT CHANGE UP (ref 3.3–5)
ALP SERPL-CCNC: 74 U/L — SIGNIFICANT CHANGE UP (ref 40–120)
ALT FLD-CCNC: 18 U/L — SIGNIFICANT CHANGE UP (ref 4–41)
ANION GAP SERPL CALC-SCNC: 14 MMOL/L — SIGNIFICANT CHANGE UP (ref 7–14)
AST SERPL-CCNC: 35 U/L — SIGNIFICANT CHANGE UP (ref 4–40)
BACTERIA # UR AUTO: NEGATIVE /HPF — SIGNIFICANT CHANGE UP
BILIRUB SERPL-MCNC: 0.4 MG/DL — SIGNIFICANT CHANGE UP (ref 0.2–1.2)
BUN SERPL-MCNC: 30 MG/DL — HIGH (ref 7–23)
CALCIUM SERPL-MCNC: 9.4 MG/DL — SIGNIFICANT CHANGE UP (ref 8.4–10.5)
CAST: 1 /LPF — SIGNIFICANT CHANGE UP (ref 0–4)
CHLORIDE SERPL-SCNC: 103 MMOL/L — SIGNIFICANT CHANGE UP (ref 98–107)
CO2 SERPL-SCNC: 20 MMOL/L — LOW (ref 22–31)
CREAT SERPL-MCNC: 1.64 MG/DL — HIGH (ref 0.5–1.3)
EGFR: 49 ML/MIN/1.73M2 — LOW
FLUAV AG NPH QL: SIGNIFICANT CHANGE UP
FLUBV AG NPH QL: SIGNIFICANT CHANGE UP
GLUCOSE SERPL-MCNC: 107 MG/DL — HIGH (ref 70–99)
MAGNESIUM SERPL-MCNC: 1.8 MG/DL — SIGNIFICANT CHANGE UP (ref 1.6–2.6)
PHOSPHATE SERPL-MCNC: 3.4 MG/DL — SIGNIFICANT CHANGE UP (ref 2.5–4.5)
POTASSIUM SERPL-MCNC: 4.8 MMOL/L — SIGNIFICANT CHANGE UP (ref 3.5–5.3)
POTASSIUM SERPL-SCNC: 4.8 MMOL/L — SIGNIFICANT CHANGE UP (ref 3.5–5.3)
PROT SERPL-MCNC: 6.9 G/DL — SIGNIFICANT CHANGE UP (ref 6–8.3)
RBC CASTS # UR COMP ASSIST: 0 /HPF — SIGNIFICANT CHANGE UP (ref 0–4)
RSV RNA NPH QL NAA+NON-PROBE: SIGNIFICANT CHANGE UP
SARS-COV-2 RNA SPEC QL NAA+PROBE: SIGNIFICANT CHANGE UP
SODIUM SERPL-SCNC: 137 MMOL/L — SIGNIFICANT CHANGE UP (ref 135–145)
SQUAMOUS # UR AUTO: 0 /HPF — SIGNIFICANT CHANGE UP (ref 0–5)
T3 SERPL-MCNC: 86 NG/DL — SIGNIFICANT CHANGE UP (ref 80–200)
TROPONIN T, HIGH SENSITIVITY RESULT: 9 NG/L — SIGNIFICANT CHANGE UP
TSH SERPL-MCNC: 2.26 UIU/ML — SIGNIFICANT CHANGE UP (ref 0.27–4.2)
WBC UR QL: 0 /HPF — SIGNIFICANT CHANGE UP (ref 0–5)

## 2024-05-14 NOTE — ED ADULT NURSE NOTE - NSFALLUNIVINTERV_ED_ALL_ED
Bed/Stretcher in lowest position, wheels locked, appropriate side rails in place/Call bell, personal items and telephone in reach/Instruct patient to call for assistance before getting out of bed/chair/stretcher/Non-slip footwear applied when patient is off stretcher/Shelly to call system/Physically safe environment - no spills, clutter or unnecessary equipment/Purposeful proactive rounding/Room/bathroom lighting operational, light cord in reach

## 2024-05-14 NOTE — ED ADULT NURSE NOTE - CHIEF COMPLAINT QUOTE
Pt s/p syncopal episode at approx 7pm. States went to start lawnmower and began feeling nauseous and lightheaded. Endorses arm injury from "starting lawnmower". Denies head strike, blood thinner use, chest pain, palpitations. Hx kidney transplant, HTN. Well appearing